# Patient Record
Sex: FEMALE | Race: BLACK OR AFRICAN AMERICAN | NOT HISPANIC OR LATINO | Employment: UNEMPLOYED | URBAN - METROPOLITAN AREA
[De-identification: names, ages, dates, MRNs, and addresses within clinical notes are randomized per-mention and may not be internally consistent; named-entity substitution may affect disease eponyms.]

---

## 2017-02-10 ENCOUNTER — HOSPITAL ENCOUNTER (EMERGENCY)
Facility: HOSPITAL | Age: 47
Discharge: HOME | End: 2017-02-10
Attending: SURGERY
Payer: MEDICAID

## 2017-02-10 VITALS
RESPIRATION RATE: 20 BRPM | SYSTOLIC BLOOD PRESSURE: 134 MMHG | DIASTOLIC BLOOD PRESSURE: 72 MMHG | OXYGEN SATURATION: 99 % | TEMPERATURE: 97 F | HEART RATE: 82 BPM

## 2017-02-10 DIAGNOSIS — S92.902A FOOT FRACTURE, LEFT, CLOSED, INITIAL ENCOUNTER: Primary | ICD-10-CM

## 2017-02-10 DIAGNOSIS — M79.673 FOOT PAIN: ICD-10-CM

## 2017-02-10 PROCEDURE — 28470 CLTX METATARSAL FX WO MNP EA: CPT

## 2017-02-10 PROCEDURE — 63600175 PHARM REV CODE 636 W HCPCS: Performed by: SURGERY

## 2017-02-10 PROCEDURE — 99283 EMERGENCY DEPT VISIT LOW MDM: CPT | Mod: 25

## 2017-02-10 PROCEDURE — 96372 THER/PROPH/DIAG INJ SC/IM: CPT

## 2017-02-10 RX ORDER — CYCLOBENZAPRINE HCL 10 MG
10 TABLET ORAL 3 TIMES DAILY PRN
Qty: 10 TABLET | Refills: 0 | Status: SHIPPED | OUTPATIENT
Start: 2017-02-10 | End: 2017-02-15

## 2017-02-10 RX ORDER — KETOROLAC TROMETHAMINE 10 MG/1
10 TABLET, FILM COATED ORAL EVERY 6 HOURS PRN
Qty: 15 TABLET | Refills: 0 | Status: SHIPPED | OUTPATIENT
Start: 2017-02-10 | End: 2017-07-06 | Stop reason: ALTCHOICE

## 2017-02-10 RX ORDER — KETOROLAC TROMETHAMINE 30 MG/ML
60 INJECTION, SOLUTION INTRAMUSCULAR; INTRAVENOUS
Status: COMPLETED | OUTPATIENT
Start: 2017-02-10 | End: 2017-02-10

## 2017-02-10 RX ADMIN — KETOROLAC TROMETHAMINE 60 MG: 30 INJECTION, SOLUTION INTRAMUSCULAR at 07:02

## 2017-02-10 NOTE — DISCHARGE INSTRUCTIONS
Foot Fracture  You have a broken bone (fracture) in your foot. This will cause pain, swelling, and sometimes bruising. It will take about 4 to 6 weeks to heal. A foot fracture may be treated with a special shoe, splint, cast, or boot.  Home care  Follow these guidelines when caring for yourself at home:  · You may be given a splint, cast, shoe, or boot to keep the injured area from moving. Unless you were told otherwise, use crutches or a walker. Dont put weight on the injured foot until your health care provider says you can do so. (You can rent crutches and a walker at many pharmacies and surgical or orthopedic supply stores.) Dont put weight on a splint, or it will break.  · Keep your leg elevated to reduce pain and swelling. When sleeping, put a pillow under the injured leg. When sitting, support the injured leg so it is level with your waist. This is very important during the first 2 days (48 hours).  · Put an ice pack on the injured area. Do this for 20 minutes every 1 to 2 hours the first day for pain relief. You can make an ice pack by wrapping a plastic bag of ice cubes in a thin towel. As the ice melts, be careful that the splint/cast/boot/shoe doesnt get wet. You can place the ice pack directly over the splint or cast. Unless told otherwise, you can open the boot or shoe to apply the ice pack. Continue using the ice pack 3 to 4 times a day for the next 2 days. Then use the ice pack as needed to ease pain and swelling.  · Keep the splint/cast/boot/shoe dry. When bathing, protect it with a large plastic bag, rubber-banded at the top end. If a fiberglass splint or cast or boot gets wet, you can dry it with a hair dryer. Unless told otherwise, you can take off the boot or shoe to bathe.  · You may use acetaminophen or ibuprofen to control pain, unless another pain medicine was prescribed. If you have chronic liver or kidney disease, talk with your health care provider before using these medicines. Also  talk with your provider if youve had a stomach ulcer or GI bleeding.  · Dont put creams or objects under the cast if you have itching.  Follow-up care  Follow up with your health care provider within 1 week, or as advised. This is to make sure the bone is healing the way it should. If you were given a splint, it may be changed to a cast or boot at your follow-up visit.  If X-rays were taken, a radiologist will look at them. You will be told of any new findings that may affect your care.  When to seek medical advice  Call your health care provider right away if any of these occur:  · The cast cracks  · The plaster cast or splint becomes wet or soft  · The fiberglass cast or splint stays wet for more than 24 hours  · Bad odor from the cast or wound fluid stains the cast  · Tightness or pain under the cast or splint gets worse  · Toes become swollen, cold, blue, numb, or tingly  · You cant move your toes  · Skin around cast becomes red  · Fever of 101ºF (38.3ºC) or higher, or as directed by your health care provider  Date Last Reviewed: 2/15/2015  © 3030-8953 [employer]. [address] [place] [address], [place] [address]. All rights reserved. This information is not intended as a substitute for professional medical care. Always follow your healthcare professional's instructions.

## 2017-02-10 NOTE — ED PROVIDER NOTES
[facility]                                        February 10, 2017                   Chief Complaint  47 y.o. female with Foot Pain (left foot pain)    History of Present Illness  Ami Kamara presents to the emergency room with left foot pain today  Patient awkwardly twisted her left foot yesterday with immediate pain afterwards  Patient on exam has left dorsal foot pain in the area of the fifth metatarsal today  Patient has good distal pulses and capillary refill, is neurovascular intact on exam  X-ray shows a nondisplaced fracture of the fifth metatarsal, cast shoe placed here    The history is provided by the patient  Past medical history: Anxiety  Past surgical history  and tubal ligation  No Known Allergies     Review of Systems and Physical Exam     Review of Systems  -- Constitution - no fever, denies fatigue, no weakness, no chills  -- Eyes - no tearing or redness, no visual disturbance  -- Ear, Nose - no tinnitus or earache, no nasal congestion or discharge  -- Mouth,Throat - no sore throat, no toothache, normal voice, normal swallowing  -- Respiratory - denies cough and congestion, no shortness of breath, no JACOB  -- Cardiovascular - denies chest pain, no palpitations, denies claudication  -- Gastrointestinal - denies abdominal pain, nausea, vomiting, or diarrhea  -- Musculoskeletal - left foot pain  -- Neurological - no headache, denies weakness or seizure; no LOC  -- Skin - denies pallor, rash, or changes in skin. no hives or welts noted    Vital Signs  -- Blood pressure is 147/73 and her pulse is 85. Her respiration is 20.      Physical Exam  -- Nursing note and vitals reviewed  -- Constitutional: Appears well-developed and well-nourished  -- Head: Atraumatic. Normocephalic. No obvious abnormality  -- Eyes: Pupils are equal and reactive to light. Normal conjunctiva and lids  -- Cardiac: Normal rate, regular rhythm and normal heart sounds  -- Pulmonary: Normal  respiratory effort, breath sounds clear to auscultation  -- Abdominal: Soft, no tenderness. Normal bowel sounds. Normal liver edge  -- Musculoskeletal: Left dorsal foot pain in the area of the fifth metatarsal  -- Neurological: No focal deficits. Showed good interaction with staff  -- Vascular: Posterior tibial, dorsalis pedis and radial pulses 2+ bilaterally      Emergency Room Course     Treatment and Evaluation  -- Left foot x-ray shows a fifth metatarsal fracture  -- An ace wrap is placed on the affected foot by the CNA  -- A cast shoe was placed over the ace wrap by the CNA   -- Crutches were also given and taught for ambulation     -- IM Toradol given today in the ER    Diagnosis  -- The primary encounter diagnosis was Foot fracture, left, closed, initial encounter    Disposition and Plan  -- Disposition: home  -- Condition: stable  -- Follow-up: Patient to follow up with Claritza Monreal NP in 1-2 days.  -- I advised the patient that we have found no life threatening condition today  -- At this time, I believe the patient is clinically stable for discharge.   -- The patient acknowledges that close follow up with a MD is required   -- Patient agrees to comply with all instruction and direction given in the ER    This note is dictated on Dragon Natural Speaking word recognition program.  There are word recognition mistakes that are occasionally missed on review.           Florin Choudhary MD  02/10/17 0749

## 2017-02-10 NOTE — ED AVS SNAPSHOT
[facility]  [address] [place] [id number]               Ami Kamara   2/10/2017  7:14 AM   ED    Description:  Female : 1970   Department:  [facility]           Your Care was Coordinated By:     Provider Role From To    Florin Choudhary MD Attending Provider 02/10/17 0716 --      Reason for Visit     Foot Pain           Diagnoses this Visit        Comments    Foot fracture, left, closed, initial encounter    -  Primary     Foot pain           ED Disposition     ED Disposition Condition Comment    Discharge             To Do List           Follow-up Information     Follow up with Claritza Monreal NP. Schedule an appointment as soon as possible for a visit in 2 days.    Specialty:  Family Medicine    Contact information:     [address]  [place] [address]  [phone]         These Medications        Disp Refills Start End    ketorolac (TORADOL) 10 mg tablet 15 tablet 0 2/10/2017     Take 1 tablet (10 mg total) by mouth every 6 (six) hours as needed for Pain. - Oral    Pharmacy: [facility]/pharmacy #[id number] [place], [place] - [address] Ph #: [phone]       cyclobenzaprine (FLEXERIL) 10 MG tablet 10 tablet 0 2/10/2017 2/15/2017    Take 1 tablet (10 mg total) by mouth 3 (three) times daily as needed for Muscle spasms. - Oral    Pharmacy: [facility]/pharmacy #[id number] [place], [place] - [address] Ph #: [phone]         [facility]     [facility] -  Assistance  Registered nurses in the [facility] provide clinical advisement, health education, appointment booking, and other advisory services.  Call for this free service at [phone].             Medications           Message regarding Medications     Verify the changes and/or additions to your medication regime listed below are the same as discussed with your clinician today.  If any of these changes or additions are incorrect, please notify your  healthcare provider.        START taking these NEW medications        Refills    ketorolac (TORADOL) 10 mg tablet 0    Sig: Take 1 tablet (10 mg total) by mouth every 6 (six) hours as needed for Pain.    Class: Normal    Route: Oral    cyclobenzaprine (FLEXERIL) 10 MG tablet 0    Sig: Take 1 tablet (10 mg total) by mouth 3 (three) times daily as needed for Muscle spasms.    Class: Normal    Route: Oral      These medications were administered today        Dose Freq    ketorolac injection 60 mg 60 mg ED 1 Time    Sig: Inject 2 mLs (60 mg total) into the muscle ED 1 Time.    Class: Normal    Route: Intramuscular           Verify that the below list of medications is an accurate representation of the medications you are currently taking.  If none reported, the list may be blank. If incorrect, please contact your healthcare provider. Carry this list with you in case of emergency.           Current Medications     busPIRone (BUSPAR) 5 MG Tab Take 1 tablet (5 mg total) by mouth 3 (three) times daily.    ferrous sulfate 325 mg (65 mg iron) Tab tablet Take 1 tablet (325 mg total) by mouth daily with breakfast.    ranitidine (ZANTAC) 300 MG tablet Take 1 tablet (300 mg total) by mouth every evening.    cyclobenzaprine (FLEXERIL) 10 MG tablet Take 1 tablet (10 mg total) by mouth 3 (three) times daily as needed for Muscle spasms.    hydrOXYzine (ATARAX) 50 MG tablet Take 1 tablet (50 mg total) by mouth 3 (three) times daily as needed for Anxiety.    ibuprofen (ADVIL,MOTRIN) 800 MG tablet Take 1 tablet (800 mg total) by mouth 3 (three) times daily as needed for Pain.    ketorolac (TORADOL) 10 mg tablet Take 1 tablet (10 mg total) by mouth every 6 (six) hours as needed for Pain.    loratadine (CLARITIN) 10 mg tablet Take 1 tablet (10 mg total) by mouth once daily.           Clinical Reference Information           Your Vitals Were     BP Pulse Temp Resp Last Period       147/73 (BP Location: Left arm, Patient Position: Sitting)  85 97.4 °F (36.3 °C) (Oral) 20 01/30/2017       Allergies as of 2/10/2017     No Known Allergies      Immunizations Administered on Date of Encounter - 2/10/2017     None      ED Micro, Lab, POCT     None      ED Imaging Orders     Start Ordered       Status Ordering Provider    02/10/17 0718 02/10/17 0717  X-Ray Foot Complete Left  1 time imaging      Final result         Discharge Instructions         Foot Fracture  You have a broken bone (fracture) in your foot. This will cause pain, swelling, and sometimes bruising. It will take about 4 to 6 weeks to heal. A foot fracture may be treated with a special shoe, splint, cast, or boot.  Home care  Follow these guidelines when caring for yourself at home:  · You may be given a splint, cast, shoe, or boot to keep the injured area from moving. Unless you were told otherwise, use crutches or a walker. Dont put weight on the injured foot until your health care provider says you can do so. (You can rent crutches and a walker at many pharmacies and surgical or orthopedic supply stores.) Dont put weight on a splint, or it will break.  · Keep your leg elevated to reduce pain and swelling. When sleeping, put a pillow under the injured leg. When sitting, support the injured leg so it is level with your waist. This is very important during the first 2 days (48 hours).  · Put an ice pack on the injured area. Do this for 20 minutes every 1 to 2 hours the first day for pain relief. You can make an ice pack by wrapping a plastic bag of ice cubes in a thin towel. As the ice melts, be careful that the splint/cast/boot/shoe doesnt get wet. You can place the ice pack directly over the splint or cast. Unless told otherwise, you can open the boot or shoe to apply the ice pack. Continue using the ice pack 3 to 4 times a day for the next 2 days. Then use the ice pack as needed to ease pain and swelling.  · Keep the splint/cast/boot/shoe dry. When bathing, protect it with a large plastic  bag, rubber-banded at the top end. If a fiberglass splint or cast or boot gets wet, you can dry it with a hair dryer. Unless told otherwise, you can take off the boot or shoe to bathe.  · You may use acetaminophen or ibuprofen to control pain, unless another pain medicine was prescribed. If you have chronic liver or kidney disease, talk with your health care provider before using these medicines. Also talk with your provider if youve had a stomach ulcer or GI bleeding.  · Dont put creams or objects under the cast if you have itching.  Follow-up care  Follow up with your health care provider within 1 week, or as advised. This is to make sure the bone is healing the way it should. If you were given a splint, it may be changed to a cast or boot at your follow-up visit.  If X-rays were taken, a radiologist will look at them. You will be told of any new findings that may affect your care.  When to seek medical advice  Call your health care provider right away if any of these occur:  · The cast cracks  · The plaster cast or splint becomes wet or soft  · The fiberglass cast or splint stays wet for more than 24 hours  · Bad odor from the cast or wound fluid stains the cast  · Tightness or pain under the cast or splint gets worse  · Toes become swollen, cold, blue, numb, or tingly  · You cant move your toes  · Skin around cast becomes red  · Fever of 101ºF (38.3ºC) or higher, or as directed by your health care provider  Date Last Reviewed: 2/15/2015  © 3321-0490 [employer]. [address] [place] [address], [place] [address]. All rights reserved. This information is not intended as a substitute for professional medical care. Always follow your healthcare professional's instructions.          Your Scheduled Appointments     Mar 02, 2017  1:00 PM CST   New Patient with GRAEME Kincaid - Neurology ([facility])    [address] [place], [place] [address] [place] [address]    [phone]            May 02, 2017  9:00 AM CDT   Mammo Diag with [facility] MAMMO1   [facility]    [address] [facility] [address]   [phone]            Aug 09, 2017  7:45 AM CDT   Fasting Lab with [facility]   [facility]    [address] [facility] [address]   [phone]              Smoking Cessation     If you would like to quit smoking:   You may be eligible for free services if you are a [place] resident and started smoking cigarettes before September 1, 1988.  Call the Smoking [employer]essation [employer] ([facility]) toll free at [phone] or [phone].   Call 3-631-QUIT-NOW if you do not meet the above criteria.             [facility] complies with applicable Federal civil rights laws and does not discriminate on the basis of race, color, national origin, age, disability, or sex.        Language Assistance Services     ATTENTION: Language assistance services are available, free of charge. Please call [phone].      ATENCIÓN: Si habla español, tiene a toro disposición servicios gratuitos de asistencia lingüística. Llame al [phone].     CHÚ Ý: N?u b?n nói Ti?ng Vi?t, có các d?ch v? h? tr? ngôn ng? mi?n phí dành cho b?n. G?i s? [phone].

## 2017-03-07 ENCOUNTER — HOSPITAL ENCOUNTER (EMERGENCY)
Facility: HOSPITAL | Age: 47
Discharge: HOME | End: 2017-03-07
Attending: EMERGENCY MEDICINE
Payer: MEDICAID

## 2017-03-07 VITALS
DIASTOLIC BLOOD PRESSURE: 79 MMHG | HEART RATE: 77 BPM | WEIGHT: 179 LBS | RESPIRATION RATE: 18 BRPM | SYSTOLIC BLOOD PRESSURE: 129 MMHG | BODY MASS INDEX: 29.79 KG/M2 | TEMPERATURE: 97 F

## 2017-03-07 DIAGNOSIS — Z00.00 WELL ADULT EXAM: Primary | ICD-10-CM

## 2017-03-07 PROCEDURE — 96372 THER/PROPH/DIAG INJ SC/IM: CPT

## 2017-03-07 PROCEDURE — 63600175 PHARM REV CODE 636 W HCPCS: Performed by: EMERGENCY MEDICINE

## 2017-03-07 PROCEDURE — 99283 EMERGENCY DEPT VISIT LOW MDM: CPT | Mod: 25

## 2017-03-07 RX ORDER — HYDROMORPHONE HYDROCHLORIDE 1 MG/ML
1 INJECTION, SOLUTION INTRAMUSCULAR; INTRAVENOUS; SUBCUTANEOUS
Status: COMPLETED | OUTPATIENT
Start: 2017-03-07 | End: 2017-03-07

## 2017-03-07 RX ORDER — PROMETHAZINE HYDROCHLORIDE 25 MG/ML
25 INJECTION, SOLUTION INTRAMUSCULAR; INTRAVENOUS
Status: COMPLETED | OUTPATIENT
Start: 2017-03-07 | End: 2017-03-07

## 2017-03-07 RX ORDER — HYDROCODONE BITARTRATE AND ACETAMINOPHEN 5; 325 MG/1; MG/1
1 TABLET ORAL EVERY 4 HOURS PRN
Qty: 12 TABLET | Refills: 0 | Status: SHIPPED | OUTPATIENT
Start: 2017-03-07 | End: 2017-06-05

## 2017-03-07 RX ADMIN — HYDROMORPHONE HYDROCHLORIDE 1 MG: 1 INJECTION, SOLUTION INTRAMUSCULAR; INTRAVENOUS; SUBCUTANEOUS at 07:03

## 2017-03-07 RX ADMIN — PROMETHAZINE HYDROCHLORIDE 25 MG: 25 INJECTION INTRAMUSCULAR; INTRAVENOUS at 07:03

## 2017-03-07 NOTE — ED AVS SNAPSHOT
[facility]  [address]  [place] [address]               Ami Kamara   3/7/2017  7:42 PM   ED    Description:  Female : 1970   Department:  [facility]           Your Care was Coordinated By:     Provider Role From To    Luis Alfredo Olmedo MD Attending Provider [phone] --      Reason for Visit     Leg Pain           Diagnoses this Visit        Comments    Well adult exam    -  Primary       ED Disposition     ED Disposition Condition Comment    Discharge             To Do List           Follow-up Information     Schedule an appointment as soon as possible for a visit with Claritza Monreal NP.    Specialty:  Family Medicine    Why:  Follow up with orthopedic as scheduled for continual care    Contact information:     [facility]  [place] [address]  [phone]         These Medications        Disp Refills Start End    hydrocodone-acetaminophen 5-325mg (NORCO) 5-325 mg per tablet 12 tablet 0 3/7/2017 3/17/2017    Take 1 tablet by mouth every 4 (four) hours as needed for Pain. - Oral    Pharmacy: [facility]/pharmacy #[id number] - [place] - [address] Ph #: [phone]         [facility]     [facility] -  Assistance  Registered nurses in the [facility] provide clinical advisement, health education, appointment booking, and other advisory services.  Call for this free service at [phone].             Medications           Message regarding Medications     Verify the changes and/or additions to your medication regime listed below are the same as discussed with your clinician today.  If any of these changes or additions are incorrect, please notify your healthcare provider.        START taking these NEW medications        Refills    hydrocodone-acetaminophen 5-325mg (NORCO) 5-325 mg per tablet 0    Sig: Take 1 tablet by mouth every 4 (four) hours as needed for Pain.    Class: Print    Route:  Oral      These medications were administered today        Dose Freq    hydromorphone (PF) injection 1 mg 1 mg ED 1 Time    Sig: Inject 1 mL (1 mg total) into the muscle ED 1 Time.    Class: Normal    Route: Intramuscular    promethazine injection 25 mg 25 mg ED 1 Time    Sig: Inject 1 mL (25 mg total) into the muscle ED 1 Time.    Class: Normal    Route: Intramuscular           Verify that the below list of medications is an accurate representation of the medications you are currently taking.  If none reported, the list may be blank. If incorrect, please contact your healthcare provider. Carry this list with you in case of emergency.           Current Medications     busPIRone (BUSPAR) 5 MG Tab Take 1 tablet (5 mg total) by mouth 3 (three) times daily.    ferrous sulfate 325 mg (65 mg iron) Tab tablet Take 1 tablet (325 mg total) by mouth daily with breakfast.    hydrocodone-acetaminophen 7.5-325mg (NORCO) 7.5-325 mg per tablet Take 1 tablet by mouth every 8 (eight) hours as needed.    ibuprofen (ADVIL,MOTRIN) 800 MG tablet Take 1 tablet (800 mg total) by mouth 3 (three) times daily as needed for Pain.    ketorolac (TORADOL) 10 mg tablet Take 1 tablet (10 mg total) by mouth every 6 (six) hours as needed for Pain.    levetiracetam (KEPPRA) 500 MG Tab Take 2 tablets (1,000 mg total) by mouth 2 (two) times daily.    loratadine (CLARITIN) 10 mg tablet Take 1 tablet (10 mg total) by mouth once daily.    ranitidine (ZANTAC) 300 MG tablet Take 1 tablet (300 mg total) by mouth every evening.    senna-docusate 8.6-50 mg (SENNA-S) 8.6-50 mg per tablet Take 1 tablet by mouth once daily. Take while on Norco to prevent constipation.    hydrocodone-acetaminophen 5-325mg (NORCO) 5-325 mg per tablet Take 1 tablet by mouth every 4 (four) hours as needed for Pain.           Clinical Reference Information           Your Vitals Were     BP Pulse Temp Resp Weight Last Period    136/85 85 97.7 °F (36.5 °C) (Oral) 20 81.2 kg (179 lb)  02/27/2017 (Approximate)    BMI                29.79 kg/m2          Allergies as of 3/7/2017     No Known Allergies      Immunizations Administered on Date of Encounter - 3/7/2017     None      ED Micro, Lab, POCT     None      ED Imaging Orders     None        Discharge Instructions         Nonurgent Medical Screening Exam  You have had a medical screening exam. The results show that you dont have a condition that needs to be treated in the emergency department.  You can safely wait until you can see your healthcare provider for evaluation or treatment. It is up to you to make an appointment for follow-up care.  Medical emergencies  If you think you have a medical emergency, please come to the emergency department. Thats what we are here for. A medical emergency might be severe pain. It might be a condition that gets worse. Or it might be problems with a pregnancy.  The emergency department is open to all who need treatment. But if you dont think you have a serious or life-threatening problem, try these other choices.  If you have a primary care doctor:  Call your doctor before coming to the emergency department.  After office hours, someone from your doctors office is on-call by phone. The person on-call may be able to give you advice over the phone on how to take care of the problem  You may be able to get an appointment to see your doctor.  If you dont have a primary care doctor:  Call the referral doctor or clinic shown below during office hours. You should be able to make an appointment to be seen.  If you arent sure whether you are having an emergency, you can always return to the emergency department to be looked at.   Phone advice from the emergency department  We are here 24 hours a day to give emergency care. But this hospital does not give phone advice for medical conditions. If you need advice for a condition that cant wait to be seen by your doctor, you will need to come back to this facility in  person.  Date Last Reviewed: 9/1/2016  © 8832-0323 [employer]. [address] [place] [address], [place] [address]. All rights reserved. This information is not intended as a substitute for professional medical care. Always follow your healthcare professional's instructions.          Your Scheduled Appointments     Mar 29, 2017 12:45 PM CDT   Established Patient Visit with GRAEME Castillo - Orthopedics ([facility])    [address] [place] [place] [address]   [phone]            Apr 03, 2017  8:00 AM CDT   Electroencephalogram with EEG, [facility] ([address] Cardiology)    [address] [place] [place] [address]   [phone]            Apr 03, 2017  9:30 AM CDT   Mri Non Contrast with [facility] MRI1   [facility] ([facility])    [address] [place] [place] [address]   [phone]            May 02, 2017  9:00 AM CDT   Mammo Diag with [facility] MAMMO1   [facility] ([facility])    [address] [place] [place] [address]   [phone]            Jun 05, 2017  1:00 PM CDT   Established Patient Visit with GRAEME Kincaid - Neurology ([facility])    [address] [address]   [phone]              Smoking Cessation     If you would like to quit smoking:   You may be eligible for free services if you are a [place] resident and started smoking cigarettes before September 1, 1988.  Call the Smoking Cessation Trust (SCT) toll free at (064) 259-7215 or (219) 505-6446.   Call 7-800-QUIT-NOW if you do not meet the above criteria.             [facility] complies with applicable Federal civil rights laws and does not discriminate on the basis of race, color, national origin, age, disability, or sex.        Language Assistance Services     ATTENTION: Language assistance services are available, free of  charge. Please call [phone].      ATENCIÓN: Si habla español, tiene a toro disposición servicios gratuitos de asistencia lingüística. Llame al [phone].     CHÚ Ý: N?u b?n nói Ti?ng Vi?t, có các d?ch v? h? tr? ngôn ng? mi?n phí dành cho b?n. G?i s? [phone].

## 2017-03-08 NOTE — ED PROVIDER NOTES
"Encounter Date: 3/7/2017       History     Chief Complaint   Patient presents with    Leg Pain     "I want this cast off."     Review of patient's allergies indicates:  No Known Allergies  HPI Comments: PAtient states fracture to foot.  States being followed by orthopedic.  States ''annoyed'' with cast.    Patient is a 47 y.o. female presenting with the following complaint: injury. The history is provided by the patient.   General Injury    The incident occurred today. The injury mechanism is unknown. She came to the ER via by private vehicle. There is an injury to the left lower leg. The pain is at a severity of 3/10. Pertinent negatives include no chest pain, no altered mental status, no numbness, no visual disturbance, no abdominal pain, no nausea, no vomiting, no bladder incontinence, no headaches, no hearing loss, no neck pain, no focal weakness, no decreased responsiveness, no light-headedness, no loss of consciousness, no seizures, no tingling, no weakness, no cough, no difficulty breathing and no memory loss. Recently, medical care has been given by a specialist.     Past Medical History:   Diagnosis Date    Anxiety     GERD (gastroesophageal reflux disease)      Past Surgical History:   Procedure Laterality Date     SECTION      x2    TUBAL LIGATION       Family History   Problem Relation Age of Onset    Hypertension Mother     Seizures Mother     Hypertension Father      Social History   Substance Use Topics    Smoking status: Current Every Day Smoker     Packs/day: 2.00     Years: 26.00    Smokeless tobacco: Never Used    Alcohol use Yes     Review of Systems   Constitutional: Negative for decreased responsiveness, fatigue and fever.   HENT: Negative for hearing loss.    Eyes: Negative for pain, discharge, redness, itching and visual disturbance.   Respiratory: Negative for cough, shortness of breath, wheezing and stridor.    Cardiovascular: Negative for chest pain and palpitations. "   Gastrointestinal: Negative for abdominal pain, nausea and vomiting.   Genitourinary: Negative for bladder incontinence, enuresis and flank pain.   Musculoskeletal: Negative for arthralgias, back pain, neck pain and neck stiffness.   Skin: Negative for color change, pallor, rash and wound.   Neurological: Negative for tingling, tremors, focal weakness, seizures, loss of consciousness, syncope, weakness, light-headedness, numbness and headaches.   Psychiatric/Behavioral: Negative for memory loss.       Physical Exam   Initial Vitals   BP Pulse Resp Temp SpO2   03/07/17 1939 03/07/17 1939 03/07/17 1939 03/07/17 1939 --   136/85 85 20 97.7 °F (36.5 °C)      Physical Exam    Nursing note and vitals reviewed.  Constitutional: She appears well-developed and well-nourished. She is not diaphoretic. No distress.   HENT:   Head: Normocephalic and atraumatic.   Mouth/Throat: No oropharyngeal exudate.   Eyes: Conjunctivae and EOM are normal. Pupils are equal, round, and reactive to light. Right eye exhibits no discharge. Left eye exhibits no discharge. No scleral icterus.   Neck: Normal range of motion. No JVD present.   Cardiovascular: Normal rate, regular rhythm and normal heart sounds. Exam reveals no friction rub.    Pulmonary/Chest: Breath sounds normal. No stridor. No respiratory distress. She has no wheezes. She has no rhonchi. She has no rales.   Abdominal: Soft. Bowel sounds are normal. She exhibits no distension. There is no tenderness. There is no rebound and no guarding.   Musculoskeletal: She exhibits no edema or tenderness.        Feet:    Neurological: She is alert and oriented to person, place, and time. She has normal strength and normal reflexes. No sensory deficit.   Skin: No rash noted.         ED Course   Procedures  Labs Reviewed - No data to display                            ED Course     Clinical Impression:   The encounter diagnosis was Well adult exam.    Disposition:   Disposition:  Discharged  Condition: Stable       Luis Alfredo Olmedo MD  03/07/17 1952

## 2017-03-08 NOTE — DISCHARGE INSTRUCTIONS
Nonurgent Medical Screening Exam  You have had a medical screening exam. The results show that you dont have a condition that needs to be treated in the emergency department.  You can safely wait until you can see your healthcare provider for evaluation or treatment. It is up to you to make an appointment for follow-up care.  Medical emergencies  If you think you have a medical emergency, please come to the emergency department. Thats what we are here for. A medical emergency might be severe pain. It might be a condition that gets worse. Or it might be problems with a pregnancy.  The emergency department is open to all who need treatment. But if you dont think you have a serious or life-threatening problem, try these other choices.  If you have a primary care doctor:  Call your doctor before coming to the emergency department.  After office hours, someone from your doctors office is on-call by phone. The person on-call may be able to give you advice over the phone on how to take care of the problem  You may be able to get an appointment to see your doctor.  If you dont have a primary care doctor:  Call the referral doctor or clinic shown below during office hours. You should be able to make an appointment to be seen.  If you arent sure whether you are having an emergency, you can always return to the emergency department to be looked at.   Phone advice from the emergency department  We are here 24 hours a day to give emergency care. But this hospital does not give phone advice for medical conditions. If you need advice for a condition that cant wait to be seen by your doctor, you will need to come back to this facility in person.  Date Last Reviewed: 9/1/2016 © 2000-2016 [employer]. [address] [place] [address], [place] [address]. All rights reserved. This information is not intended as a substitute for professional medical care. Always follow your healthcare professional's instructions.

## 2017-07-06 ENCOUNTER — HOSPITAL ENCOUNTER (EMERGENCY)
Facility: HOSPITAL | Age: 47
Discharge: HOME | End: 2017-07-06
Attending: SURGERY
Payer: MEDICAID

## 2017-07-06 VITALS
RESPIRATION RATE: 18 BRPM | OXYGEN SATURATION: 99 % | TEMPERATURE: 99 F | BODY MASS INDEX: 24.96 KG/M2 | DIASTOLIC BLOOD PRESSURE: 74 MMHG | WEIGHT: 150 LBS | HEART RATE: 81 BPM | SYSTOLIC BLOOD PRESSURE: 129 MMHG

## 2017-07-06 DIAGNOSIS — M79.646 FINGER PAIN: ICD-10-CM

## 2017-07-06 PROCEDURE — 25000003 PHARM REV CODE 250: Performed by: SURGERY

## 2017-07-06 PROCEDURE — 99283 EMERGENCY DEPT VISIT LOW MDM: CPT

## 2017-07-06 RX ORDER — KETOROLAC TROMETHAMINE 10 MG/1
10 TABLET, FILM COATED ORAL EVERY 6 HOURS PRN
Qty: 15 TABLET | Refills: 0 | Status: SHIPPED | OUTPATIENT
Start: 2017-07-06 | End: 2018-07-19

## 2017-07-06 RX ORDER — KETOROLAC TROMETHAMINE 10 MG/1
10 TABLET, FILM COATED ORAL
Status: COMPLETED | OUTPATIENT
Start: 2017-07-06 | End: 2017-07-06

## 2017-07-06 RX ADMIN — KETOROLAC TROMETHAMINE 10 MG: 10 TABLET, FILM COATED ORAL at 03:07

## 2017-07-06 NOTE — ED PROVIDER NOTES
[facility]                                        2017                   Chief Complaint  47 y.o. female with Finger Injury    History of Present Illness  Ami Kamara presents to the emergency room with right fifth finger pain  Patient on exam has no obvious trauma or bruising, no swelling to the right fifth finger  Patient states that her right fifth finger at the tip and middle phalanx has been hurting  Patient denies any trauma or fall, has no obvious acute pathology on examination  Patient has good distal pulses and capillary refill, is neurovascular intact on exam  Patient states her right fifth finger hurts, good  and normal tone appreciated     The history is provided by the patient  Past medical history: Anxiety  Past surgical history  and tubal ligation  No Known Allergies   Family history: Hypertension & seizures    Review of Systems and Physical Exam     Review of Systems  -- Constitution - no fever, denies fatigue, no weakness, no chills  -- Eyes - no tearing or redness, no visual disturbance  -- Ear, Nose - no tinnitus or earache, no nasal congestion or discharge  -- Mouth,Throat - no sore throat, no toothache, normal voice, normal swallowing  -- Respiratory - denies cough and congestion, no shortness of breath, no JACOB  -- Cardiovascular - denies chest pain, no palpitations, denies claudication  -- Gastrointestinal - denies abdominal pain, nausea, vomiting, or diarrhea  -- Musculoskeletal - right fifth finger pain this morning  -- Neurological - no headache, denies weakness or seizure; no LOC  -- Skin - denies pallor, rash, or changes in skin. no hives or welts noted    Vital Signs  -- Her blood pressure is 129/74 and her pulse is 81.   -- Her respiration is 18 and oxygen saturation is 99%.      Physical Exam  -- Nursing note and vitals reviewed  -- Head: Atraumatic. Normocephalic. No obvious abnormality  -- Eyes: Pupils are equal and reactive to  light. Normal conjunctiva and lids  -- Cardiac: Normal rate, regular rhythm and normal heart sounds  -- Pulmonary: Normal respiratory effort, breath sounds clear to auscultation  -- Abdominal: Soft, no tenderness. Normal bowel sounds. Normal liver edge  -- Musculoskeletal: Normal range of motion, no effusions. Joints stable   -- Neurological: No focal deficits. Showed good interaction with staff  -- Vascular: Posterior tibial, dorsalis pedis and radial pulses 2+ bilaterally    -- Lymphatics: No cervical or peripheral lymphadenopathy. No edema noted    Emergency Room Course     Treatment and Evaluation  -- Preliminary ER x-ray readings showed no evidence of fracture or dislocation  -- All x-rays are reviewed with a final disposition given by the radiologist   -- 10 mg Toradol by mouth given in the ER    Diagnosis  -- The encounter diagnosis was Finger pain.    Disposition and Plan  -- Disposition: home  -- Condition: stable  -- Follow-up: Patient to follow up with Claritza Monreal NP in 1-2 days.  -- I advised the patient that we have found no life threatening condition today  -- At this time, I believe the patient is clinically stable for discharge.   -- The patient acknowledges that close follow up with a MD is required   -- Patient agrees to comply with all instruction and direction given in the ER    This note is dictated on Dragon Natural Speaking word recognition program.  There are word recognition mistakes that are occasionally missed on review.           Florin Choudhary MD  07/06/17 4945

## 2017-11-16 ENCOUNTER — TELEPHONE (OUTPATIENT)
Dept: ADMINISTRATIVE | Facility: HOSPITAL | Age: 47
End: 2017-11-16

## 2018-01-16 ENCOUNTER — TELEPHONE (OUTPATIENT)
Dept: ADMINISTRATIVE | Facility: HOSPITAL | Age: 48
End: 2018-01-16

## 2018-01-19 ENCOUNTER — HOSPITAL ENCOUNTER (EMERGENCY)
Facility: HOSPITAL | Age: 48
Discharge: HOME | End: 2018-01-19
Attending: SURGERY
Payer: MEDICAID

## 2018-01-19 VITALS
WEIGHT: 155 LBS | HEART RATE: 89 BPM | BODY MASS INDEX: 25.79 KG/M2 | RESPIRATION RATE: 20 BRPM | TEMPERATURE: 99 F | SYSTOLIC BLOOD PRESSURE: 128 MMHG | DIASTOLIC BLOOD PRESSURE: 76 MMHG

## 2018-01-19 DIAGNOSIS — N30.01 ACUTE CYSTITIS WITH HEMATURIA: Primary | ICD-10-CM

## 2018-01-19 DIAGNOSIS — A59.9 TRICHOMONAS INFECTION: ICD-10-CM

## 2018-01-19 DIAGNOSIS — N92.1 MENORRHAGIA WITH IRREGULAR CYCLE: ICD-10-CM

## 2018-01-19 LAB
ALBUMIN SERPL BCP-MCNC: 4.1 G/DL
ALP SERPL-CCNC: 78 U/L
ALT SERPL W/O P-5'-P-CCNC: 7 U/L
AMPHET+METHAMPHET UR QL: NORMAL
ANION GAP SERPL CALC-SCNC: 11 MMOL/L
AST SERPL-CCNC: 12 U/L
B-HCG UR QL: NEGATIVE
BACTERIA #/AREA URNS HPF: ABNORMAL /HPF
BARBITURATES UR QL SCN>200 NG/ML: NEGATIVE
BASOPHILS # BLD AUTO: 0.02 K/UL
BASOPHILS NFR BLD: 0.5 %
BENZODIAZ UR QL SCN>200 NG/ML: NEGATIVE
BILIRUB SERPL-MCNC: 0.7 MG/DL
BILIRUB UR QL STRIP: NEGATIVE
BUN SERPL-MCNC: 5 MG/DL
BZE UR QL SCN: NEGATIVE
CALCIUM SERPL-MCNC: 9.2 MG/DL
CANNABINOIDS UR QL SCN: NORMAL
CHLORIDE SERPL-SCNC: 104 MMOL/L
CLARITY UR: CLEAR
CO2 SERPL-SCNC: 23 MMOL/L
COLOR UR: YELLOW
CREAT SERPL-MCNC: 0.9 MG/DL
CREAT UR-MCNC: 57.4 MG/DL
DIFFERENTIAL METHOD: ABNORMAL
EOSINOPHIL # BLD AUTO: 0.1 K/UL
EOSINOPHIL NFR BLD: 3.2 %
ERYTHROCYTE [DISTWIDTH] IN BLOOD BY AUTOMATED COUNT: 14.4 %
EST. GFR  (AFRICAN AMERICAN): >60 ML/MIN/1.73 M^2
EST. GFR  (NON AFRICAN AMERICAN): >60 ML/MIN/1.73 M^2
GLUCOSE SERPL-MCNC: 100 MG/DL
GLUCOSE UR QL STRIP: NEGATIVE
HCT VFR BLD AUTO: 31 %
HGB BLD-MCNC: 10.5 G/DL
HGB UR QL STRIP: ABNORMAL
KETONES UR QL STRIP: NEGATIVE
LEUKOCYTE ESTERASE UR QL STRIP: ABNORMAL
LYMPHOCYTES # BLD AUTO: 2 K/UL
LYMPHOCYTES NFR BLD: 45.4 %
MCH RBC QN AUTO: 25.5 PG
MCHC RBC AUTO-ENTMCNC: 33.9 G/DL
MCV RBC AUTO: 75 FL
METHADONE UR QL SCN>300 NG/ML: NEGATIVE
MICROSCOPIC COMMENT: ABNORMAL
MONOCYTES # BLD AUTO: 0.4 K/UL
MONOCYTES NFR BLD: 9 %
NEUTROPHILS # BLD AUTO: 1.8 K/UL
NEUTROPHILS NFR BLD: 41.9 %
NITRITE UR QL STRIP: NEGATIVE
OPIATES UR QL SCN: NEGATIVE
PCP UR QL SCN>25 NG/ML: NEGATIVE
PH UR STRIP: 6 [PH] (ref 5–8)
PLATELET # BLD AUTO: 262 K/UL
PMV BLD AUTO: 10.7 FL
POTASSIUM SERPL-SCNC: 3.5 MMOL/L
PROT SERPL-MCNC: 7.3 G/DL
PROT UR QL STRIP: NEGATIVE
RBC # BLD AUTO: 4.11 M/UL
RBC #/AREA URNS HPF: 25 /HPF (ref 0–4)
SODIUM SERPL-SCNC: 138 MMOL/L
SP GR UR STRIP: <=1.005 (ref 1–1.03)
TOXICOLOGY INFORMATION: NORMAL
TRICHOMONAS UR QL MICRO: ABNORMAL
URN SPEC COLLECT METH UR: ABNORMAL
UROBILINOGEN UR STRIP-ACNC: NEGATIVE EU/DL
WBC # BLD AUTO: 4.34 K/UL
WBC #/AREA URNS HPF: 10 /HPF (ref 0–5)

## 2018-01-19 PROCEDURE — 81000 URINALYSIS NONAUTO W/SCOPE: CPT | Mod: 59

## 2018-01-19 PROCEDURE — 36415 COLL VENOUS BLD VENIPUNCTURE: CPT

## 2018-01-19 PROCEDURE — 63600175 PHARM REV CODE 636 W HCPCS: Performed by: SURGERY

## 2018-01-19 PROCEDURE — 25000003 PHARM REV CODE 250: Performed by: SURGERY

## 2018-01-19 PROCEDURE — 80307 DRUG TEST PRSMV CHEM ANLYZR: CPT

## 2018-01-19 PROCEDURE — 99284 EMERGENCY DEPT VISIT MOD MDM: CPT | Mod: 25

## 2018-01-19 PROCEDURE — 85025 COMPLETE CBC W/AUTO DIFF WBC: CPT

## 2018-01-19 PROCEDURE — 81025 URINE PREGNANCY TEST: CPT

## 2018-01-19 PROCEDURE — 80053 COMPREHEN METABOLIC PANEL: CPT

## 2018-01-19 PROCEDURE — 96372 THER/PROPH/DIAG INJ SC/IM: CPT

## 2018-01-19 PROCEDURE — 87086 URINE CULTURE/COLONY COUNT: CPT

## 2018-01-19 RX ORDER — NITROFURANTOIN 25; 75 MG/1; MG/1
100 CAPSULE ORAL 2 TIMES DAILY
Qty: 14 CAPSULE | Refills: 0 | Status: SHIPPED | OUTPATIENT
Start: 2018-01-19 | End: 2018-01-26

## 2018-01-19 RX ORDER — NORGESTIMATE AND ETHINYL ESTRADIOL 7DAYSX3 LO
1 KIT ORAL DAILY
Qty: 30 TABLET | Refills: 11 | Status: SHIPPED | OUTPATIENT
Start: 2018-01-19 | End: 2018-10-19 | Stop reason: ALTCHOICE

## 2018-01-19 RX ORDER — CEFTRIAXONE 1 G/1
1 INJECTION, POWDER, FOR SOLUTION INTRAMUSCULAR; INTRAVENOUS ONCE
Status: COMPLETED | OUTPATIENT
Start: 2018-01-19 | End: 2018-01-19

## 2018-01-19 RX ORDER — METRONIDAZOLE 500 MG/1
1000 TABLET ORAL
Status: COMPLETED | OUTPATIENT
Start: 2018-01-19 | End: 2018-01-19

## 2018-01-19 RX ORDER — LIDOCAINE HYDROCHLORIDE 10 MG/ML
1 INJECTION INFILTRATION; PERINEURAL ONCE
Status: COMPLETED | OUTPATIENT
Start: 2018-01-19 | End: 2018-01-19

## 2018-01-19 RX ADMIN — LIDOCAINE HYDROCHLORIDE 1 ML: 10 INJECTION, SOLUTION INFILTRATION; PERINEURAL at 06:01

## 2018-01-19 RX ADMIN — CEFTRIAXONE SODIUM 1 G: 1 INJECTION, POWDER, FOR SOLUTION INTRAMUSCULAR; INTRAVENOUS at 06:01

## 2018-01-19 RX ADMIN — METRONIDAZOLE 1000 MG: 500 TABLET ORAL at 06:01

## 2018-01-19 NOTE — ED TRIAGE NOTES
48 y.o. female presents to ER ED 01/ED 01A   Chief Complaint   Patient presents with    Dysmenorrhea   Pt reports that she has been on her menstrual cycle for 11 days, and wants it to stop. No acute distress noted.

## 2018-01-20 NOTE — ED PROVIDER NOTES
[facility]                                         2018                   Chief Complaint  48 y.o. female with Dysmenorrhea    History of Present Illness  Ami Kamara presents to the emergency room with irregular menstrual bleeding   Pt has had heavy cycles for last couple months, on again off again bleeding for last 10 days   Patient on exam has a soft abdomen no flank pain, no suprapubic pain on ER evaluation today   Patient states that she has had irregular menstrual cycles for some time, mild dysuria today  Patient denies any vaginal discharge, denies any hematuria , denies any risk for STD or PID    The history is provided by the patient  Past medical history: Anxiety  Past surgical history  and tubal ligation  No Known Allergies   Family history: Hypertension & seizures    Review of Systems and Physical Exam     Review of Systems  -- Constitution - no fever, denies fatigue, no weakness, no chills  -- Eyes - no tearing or redness, no visual disturbance  -- Ear, Nose - no tinnitus or earache, no nasal congestion or discharge  -- Mouth,Throat - no sore throat, no toothache, normal voice, normal swallowing  -- Respiratory - denies cough and congestion, no shortness of breath, no JACOB  -- Cardiovascular - denies chest pain, no palpitations, denies claudication  -- Gastrointestinal - denies abdominal pain, nausea, vomiting, or diarrhea  -- Genitourinary - heavy menustration, dysuria, no denies flank pain, no hematuria   -- Musculoskeletal - denies back pain, negative for myalgias and arthralgias   -- Neurological - no headache, denies weakness or seizure; no LOC  -- Skin - denies pallor, rash, or changes in skin. no hives or welts noted    Vital Signs  -- Her oral temperature is 98.5 °F (36.9 °C).   -- Her blood pressure is 128/76 and her pulse is 89.   -- Her respiration is 20.      Physical Exam  -- Nursing note and vitals reviewed  -- Head: Atraumatic.  Normocephalic. No obvious abnormality  -- Eyes: Pupils are equal and reactive to light. Normal conjunctiva and lids  -- Nose: Nose normal in appearance, nares grossly normal. No discharge  -- Throat: Mucous membranes moist, pharynx normal, normal tonsils. No lesions   -- Ears: External ears and TM normal bilaterally. Normal hearing and no drainage  -- Neck: Normal range of motion. Neck supple. No masses, trachea midline  -- Cardiac: Normal rate, regular rhythm and normal heart sounds  -- Pulmonary: Normal respiratory effort, breath sounds clear to auscultation  -- Abdominal: Soft, no tenderness. Normal bowel sounds. Normal liver edge  -- Musculoskeletal: Normal range of motion, no effusions. Joints stable   -- Neurological: No focal deficits. Showed good interaction with staff  -- Vascular: Posterior tibial, dorsalis pedis and radial pulses 2+ bilaterally      Emergency Room Course     Labs     K 3.5      CO2 23   BUN 5 (L)   CREATININE 0.9      ALKPHOS 78   AST 12   ALT 7 (L)   BILITOT 0.7   ALBUMIN 4.1   PROT 7.3   WBC 4.34   HGB 10.5 (L)   HCT 31.0 (L)        Urinalysis  -- Urinalysis performed during this ER visit showed signs of infection and Trichomonas  -- The urine today has been sent for lab culture, results pending   -- The urine pregnancy test today was negative; patient informed of the results     Medications Given  -- cefTRIAXone injection 1 g (1 g Intramuscular Given 1/19/18 1835)   -- lidocaine HCL 10 mg/ml (1%) injection 1 mL (1 mL Intramuscular Given 1/19/18 1835)   -- metroNIDAZOLE tablet 1,000 mg (1,000 mg Oral Given 1/19/18 1835)     Medical Decision Making  -- Diagnosis management comments: 48 y.o. female with irregular menstrual cycles  --  Patient counseled to follow up with OB/GYN on Monday for a formal pap smear    Diagnosis  -- Acute cystitis with hematuria  -- Trichomonas infection   -- Menorrhagia with irregular cycle     Disposition and Plan  -- Disposition:  home  -- Condition: stable  -- Follow-up: Patient to follow up with OBGYN in 1-2 days.  -- I advised the patient that we have found no life threatening condition today  -- At this time, I believe the patient is clinically stable for discharge.   -- The patient acknowledges that close follow up with a MD is required   -- Patient agrees to comply with all instruction and direction given in the ER    This note is dictated on Dragon [employer] word recognition program.  There are word recognition mistakes that are occasionally missed on review.           Florin Choudhary MD  01/19/18 2040

## 2018-01-21 LAB
BACTERIA UR CULT: NORMAL
BACTERIA UR CULT: NORMAL

## 2018-07-17 ENCOUNTER — HOSPITAL ENCOUNTER (EMERGENCY)
Facility: HOSPITAL | Age: 48
Discharge: HOME | End: 2018-07-17
Attending: SURGERY
Payer: MEDICAID

## 2018-07-17 VITALS
DIASTOLIC BLOOD PRESSURE: 78 MMHG | HEART RATE: 74 BPM | RESPIRATION RATE: 14 BRPM | SYSTOLIC BLOOD PRESSURE: 127 MMHG | TEMPERATURE: 99 F

## 2018-07-17 DIAGNOSIS — N92.4 EXCESSIVE BLEEDING IN PREMENOPAUSAL PERIOD: Primary | ICD-10-CM

## 2018-07-17 LAB
ALBUMIN SERPL BCP-MCNC: 3.8 G/DL
ALP SERPL-CCNC: 65 U/L
ALT SERPL W/O P-5'-P-CCNC: 11 U/L
ANION GAP SERPL CALC-SCNC: 6 MMOL/L
AST SERPL-CCNC: 14 U/L
BASOPHILS # BLD AUTO: 0.01 K/UL
BASOPHILS NFR BLD: 0.2 %
BILIRUB SERPL-MCNC: 0.8 MG/DL
BUN SERPL-MCNC: 8 MG/DL
CALCIUM SERPL-MCNC: 9 MG/DL
CHLORIDE SERPL-SCNC: 108 MMOL/L
CO2 SERPL-SCNC: 25 MMOL/L
CREAT SERPL-MCNC: 0.9 MG/DL
DIFFERENTIAL METHOD: ABNORMAL
EOSINOPHIL # BLD AUTO: 0.1 K/UL
EOSINOPHIL NFR BLD: 2.9 %
ERYTHROCYTE [DISTWIDTH] IN BLOOD BY AUTOMATED COUNT: 15.7 %
EST. GFR  (AFRICAN AMERICAN): >60 ML/MIN/1.73 M^2
EST. GFR  (NON AFRICAN AMERICAN): >60 ML/MIN/1.73 M^2
GLUCOSE SERPL-MCNC: 97 MG/DL
HCT VFR BLD AUTO: 31.8 %
HGB BLD-MCNC: 10.5 G/DL
INR PPP: 1.1
LYMPHOCYTES # BLD AUTO: 1.8 K/UL
LYMPHOCYTES NFR BLD: 37 %
MCH RBC QN AUTO: 25.4 PG
MCHC RBC AUTO-ENTMCNC: 33 G/DL
MCV RBC AUTO: 77 FL
MONOCYTES # BLD AUTO: 0.4 K/UL
MONOCYTES NFR BLD: 7.4 %
NEUTROPHILS # BLD AUTO: 2.6 K/UL
NEUTROPHILS NFR BLD: 52.5 %
PLATELET # BLD AUTO: 306 K/UL
PMV BLD AUTO: 10 FL
POTASSIUM SERPL-SCNC: 4.5 MMOL/L
PROT SERPL-MCNC: 7.1 G/DL
PROTHROMBIN TIME: 11.4 SEC
RBC # BLD AUTO: 4.13 M/UL
SODIUM SERPL-SCNC: 139 MMOL/L
WBC # BLD AUTO: 4.86 K/UL

## 2018-07-17 PROCEDURE — 80053 COMPREHEN METABOLIC PANEL: CPT

## 2018-07-17 PROCEDURE — 85025 COMPLETE CBC W/AUTO DIFF WBC: CPT

## 2018-07-17 PROCEDURE — 85610 PROTHROMBIN TIME: CPT

## 2018-07-17 PROCEDURE — 99283 EMERGENCY DEPT VISIT LOW MDM: CPT

## 2018-07-17 PROCEDURE — 36415 COLL VENOUS BLD VENIPUNCTURE: CPT

## 2018-07-17 NOTE — ED PROVIDER NOTES
Encounter Date: 2018       History     Chief Complaint   Patient presents with    Vaginal Bleeding     Patient is a 47yo B female with ongoing intermittent menorrhagia for several months.  Was seen here for this in 2018, arranged f/u with Dr. Mc of [facility], but patient failed to follow-up with Dr. Mc.  She states that she has been bleeding for several days.          Review of patient's allergies indicates:  No Known Allergies  Past Medical History:   Diagnosis Date    Anxiety     GERD (gastroesophageal reflux disease)      Past Surgical History:   Procedure Laterality Date     SECTION      x2    TUBAL LIGATION       Family History   Problem Relation Age of Onset    Hypertension Mother     Seizures Mother     Hypertension Father      Social History   Substance Use Topics    Smoking status: Current Every Day Smoker     Packs/day: 2.00     Years: 26.00    Smokeless tobacco: Never Used    Alcohol use Yes     Review of Systems   Constitutional: Positive for fatigue.   Respiratory: Negative.    Gastrointestinal: Negative.    Genitourinary: Positive for menstrual problem, pelvic pain and vaginal bleeding.   Musculoskeletal: Negative.    Neurological: Negative.    Hematological: Negative.    Psychiatric/Behavioral: Negative.        Physical Exam     Initial Vitals [18 0959]   BP Pulse Resp Temp SpO2   (!) 133/93 74 18 97.4 °F (36.3 °C) --      MAP       --         Physical Exam    Nursing note and vitals reviewed.  Constitutional: She appears well-developed and well-nourished.   HENT:   Head: Normocephalic and atraumatic.   Eyes: EOM are normal. Pupils are equal, round, and reactive to light.   Neck: Normal range of motion. Neck supple.   Cardiovascular: Normal rate and regular rhythm.   Pulmonary/Chest: Breath sounds normal. No respiratory distress. She has no wheezes. She has no rales.   Abdominal: Soft. Bowel sounds are normal.   Neurological: She is alert and oriented to  person, place, and time.   Skin: Skin is warm and dry.   Psychiatric: She has a normal mood and affect. Thought content normal.         ED Course   Procedures  Labs Reviewed - No data to display       Imaging Results    None        Hct stable at 31.  Will refer to OB/GYN.                       Clinical Impression:   The encounter diagnosis was Excessive bleeding in premenopausal period.      Disposition:   Disposition: Discharged  Condition: Stable                        Bear Julio Jr., MD  07/17/18 1414

## 2018-07-17 NOTE — ED NOTES
Discharged to home/self care.    - Condition at discharge: Good  - Mode of Discharge: Ambulatory  - The patient left the ED by herself.  - The discharge instructions were discussed with the patient.  - She stated an understanding of the discharge instructions.  - Walked pt to the discharge station.

## 2019-01-01 ENCOUNTER — HOSPITAL ENCOUNTER (EMERGENCY)
Facility: HOSPITAL | Age: 49
Discharge: HOME | End: 2019-01-01
Attending: SURGERY
Payer: MEDICAID

## 2019-01-01 VITALS
HEART RATE: 88 BPM | DIASTOLIC BLOOD PRESSURE: 76 MMHG | RESPIRATION RATE: 18 BRPM | WEIGHT: 168.63 LBS | OXYGEN SATURATION: 99 % | SYSTOLIC BLOOD PRESSURE: 133 MMHG | BODY MASS INDEX: 28.07 KG/M2 | TEMPERATURE: 99 F

## 2019-01-01 DIAGNOSIS — J00 ACUTE NASOPHARYNGITIS: Primary | ICD-10-CM

## 2019-01-01 PROCEDURE — 99284 EMERGENCY DEPT VISIT MOD MDM: CPT | Mod: 25

## 2019-01-01 PROCEDURE — 63600175 PHARM REV CODE 636 W HCPCS: Performed by: SURGERY

## 2019-01-01 PROCEDURE — 96372 THER/PROPH/DIAG INJ SC/IM: CPT

## 2019-01-01 RX ORDER — PROMETHAZINE HYDROCHLORIDE AND DEXTROMETHORPHAN HYDROBROMIDE 6.25; 15 MG/5ML; MG/5ML
5 SYRUP ORAL EVERY 6 HOURS PRN
Qty: 118 ML | Refills: 0 | Status: SHIPPED | OUTPATIENT
Start: 2019-01-01 | End: 2019-01-11

## 2019-01-01 RX ORDER — LEVOFLOXACIN 500 MG/1
500 TABLET, FILM COATED ORAL DAILY
Qty: 7 TABLET | Refills: 0 | Status: SHIPPED | OUTPATIENT
Start: 2019-01-01 | End: 2019-01-08

## 2019-01-01 RX ORDER — METHYLPREDNISOLONE SOD SUCC 125 MG
125 VIAL (EA) INJECTION
Status: COMPLETED | OUTPATIENT
Start: 2019-01-01 | End: 2019-01-01

## 2019-01-01 RX ORDER — METHYLPREDNISOLONE 4 MG/1
TABLET ORAL
Qty: 1 PACKAGE | Refills: 0 | Status: SHIPPED | OUTPATIENT
Start: 2019-01-01 | End: 2019-04-01

## 2019-01-01 RX ADMIN — METHYLPREDNISOLONE SODIUM SUCCINATE 125 MG: 125 INJECTION, POWDER, FOR SOLUTION INTRAMUSCULAR; INTRAVENOUS at 04:01

## 2019-01-01 NOTE — ED PROVIDER NOTES
[facility]                                                 Chief Complaint  48 y.o. female with Sinusitis    History of Present Illness  Ami Kamara presents to the emergency room with nasal congestion  Patient has nasal congestion and sinus pressure, clear nasal drainage in the ER  Patient has clear lung sounds bilaterally, cough and cold symptoms this weekend  The patient has no nausea vomiting or diarrhea, no flu-like symptoms on ER ROS    The history is provided by the patient   device was not used during this ER visit  Past medical history: Anxiety  Past surgical history  and tubal ligation  No Known Allergies   Family history: Hypertension & seizures    Review of Systems and Physical Exam      Review of Systems  -- Constitution - no fever, denies fatigue, no weakness, no chills  -- Eyes - no tearing or redness, no visual disturbance  -- Ear, Nose - sneezing, nasal congestion and clear discharge   -- Mouth,Throat - no sore throat, no toothache, normal voice, normal swallowing  -- Respiratory - cough and congestion, no shortness of breath, no JACOB  -- Cardiovascular - denies chest pain, no palpitations, denies claudication  -- Gastrointestinal - denies abdominal pain, nausea, vomiting, or diarrhea  -- Genitourinary - no dysuria, denies flank pain, no hematuria, no STD risk  -- Musculoskeletal - denies back pain, negative for trauma or injury  -- Neurological - no headache, denies weakness or seizure; no LOC  -- Skin - denies pallor, rash, or changes in skin. no hives or welts noted  -- Psychiatric - Denies SI or HI, no psychosis or fractured thought noted     Vital Signs  Her oral temperature is 98.7 °F (37.1 °C).   Her blood pressure is 133/76 and her pulse is 88.   Her respiration is 18 and oxygen saturation is 99%.     Physical Exam  -- Nursing note and vitals reviewed  -- Constitutional: Appears well-developed and well-nourished  -- Head: Atraumatic.  Normocephalic. No obvious abnormality  -- Eyes: Pupils are equal and reactive to light. Normal conjunctiva and lids  -- Nose: nasal mucosa erythema and edema; clear nasal discharge noted   -- Throat: Mucous membranes moist, pharynx normal, normal tonsils. No lesions   -- Ears: External ears and TM normal bilaterally. Normal hearing and no drainage  -- Neck: Normal range of motion. Neck supple. No masses, trachea midline  -- Cardiac: Normal rate, regular rhythm and normal heart sounds  -- Pulmonary: Normal respiratory effort, breath sounds clear to auscultation  -- Abdominal: Soft, no tenderness. Normal bowel sounds. Normal liver edge  -- Musculoskeletal: Normal range of motion, no effusions. Joints stable   -- Neurological: No focal deficits. Showed good interaction with staff    Emergency Room Course      Treatment and Evaluation  --  mg Solumedrol given today in the ER    Diagnosis  -- The encounter diagnosis was Acute nasopharyngitis.    Disposition and Plan  -- Disposition: home  -- Condition: stable  -- Follow-up: Patient to follow up with Claritza Monreal NP in 1-2 days.  -- I advised the patient that we have found no life threatening condition today  -- At this time, I believe the patient is clinically stable for discharge.   -- The patient acknowledges that close follow up with a MD is required   -- Patient agrees to comply with all instruction and direction given in the ER    This note is dictated on M*Modal word recognition program.  There are word recognition mistakes that are occasionally missed on review.         Florin Choudahry MD  01/01/19 6308

## 2019-03-02 ENCOUNTER — HOSPITAL ENCOUNTER (EMERGENCY)
Facility: HOSPITAL | Age: 49
Discharge: HOME | End: 2019-03-02
Attending: SURGERY
Payer: MEDICAID

## 2019-03-02 VITALS
DIASTOLIC BLOOD PRESSURE: 76 MMHG | BODY MASS INDEX: 27.29 KG/M2 | WEIGHT: 164 LBS | SYSTOLIC BLOOD PRESSURE: 124 MMHG | RESPIRATION RATE: 16 BRPM | HEART RATE: 82 BPM | TEMPERATURE: 98 F | OXYGEN SATURATION: 99 %

## 2019-03-02 DIAGNOSIS — G89.29 CHRONIC DENTAL PAIN: Primary | ICD-10-CM

## 2019-03-02 DIAGNOSIS — K08.9 CHRONIC DENTAL PAIN: Primary | ICD-10-CM

## 2019-03-02 PROCEDURE — 63600175 PHARM REV CODE 636 W HCPCS: Performed by: SURGERY

## 2019-03-02 PROCEDURE — 96372 THER/PROPH/DIAG INJ SC/IM: CPT

## 2019-03-02 PROCEDURE — 99284 EMERGENCY DEPT VISIT MOD MDM: CPT | Mod: 25

## 2019-03-02 RX ORDER — IBUPROFEN 800 MG/1
800 TABLET ORAL EVERY 6 HOURS PRN
Qty: 20 TABLET | Refills: 0 | Status: ON HOLD | OUTPATIENT
Start: 2019-03-02 | End: 2019-05-08

## 2019-03-02 RX ORDER — ONDANSETRON 2 MG/ML
4 INJECTION INTRAMUSCULAR; INTRAVENOUS
Status: COMPLETED | OUTPATIENT
Start: 2019-03-02 | End: 2019-03-02

## 2019-03-02 RX ORDER — MORPHINE SULFATE 2 MG/ML
2 INJECTION, SOLUTION INTRAMUSCULAR; INTRAVENOUS
Status: COMPLETED | OUTPATIENT
Start: 2019-03-02 | End: 2019-03-02

## 2019-03-02 RX ORDER — AMOXICILLIN 875 MG/1
875 TABLET, FILM COATED ORAL 2 TIMES DAILY
Qty: 14 TABLET | Refills: 0 | Status: SHIPPED | OUTPATIENT
Start: 2019-03-02 | End: 2019-03-09

## 2019-03-02 RX ADMIN — ONDANSETRON 4 MG: 2 INJECTION INTRAMUSCULAR; INTRAVENOUS at 05:03

## 2019-03-02 RX ADMIN — PENICILLIN G BENZATHINE 1.2 MILLION UNITS: 1200000 INJECTION, SUSPENSION INTRAMUSCULAR at 05:03

## 2019-03-02 RX ADMIN — MORPHINE SULFATE 2 MG: 2 INJECTION, SOLUTION INTRAMUSCULAR; INTRAVENOUS at 05:03

## 2019-03-02 NOTE — ED PROVIDER NOTES
[facility]                                                 Chief Complaint  49 y.o. female with Dental Pain    History of Present Illness  Ami Kamara presents to the emergency room with chronic dental pain  Patient on exam has upper and lower molar cavities with no facial swelling noted  Patient has no signs of dental abscess, no fever, no complication on ER evaluation  Patient states that she has 5/10 pain with any chewing meals over the last week now    The history is provided by the patient   device was not used during this ER visit  Past medical history: Anxiety  Past surgical history  and tubal ligation  No Known Allergies   Family history: Hypertension & seizures    Review of Systems and Physical Exam      Review of Systems  -- Constitution - no fever, denies fatigue, no weakness, no chills  -- Eyes - no tearing or redness, no visual disturbance  -- Ear, Nose - no tinnitus or earache, no nasal congestion or discharge  -- Mouth,Throat - toothache, normal voice, normal swallowing  -- Respiratory - denies cough and congestion, no shortness of breath, no JACOB  -- Cardiovascular - denies chest pain, no palpitations, denies claudication  -- Gastrointestinal - denies abdominal pain, nausea, vomiting, or diarrhea  -- Musculoskeletal - denies back pain, negative for trauma or injury  -- Neurological - no headache, denies weakness or seizure; no LOC  -- Skin - denies pallor, rash, or changes in skin. no hives or welts noted    Vital Signs  Her blood pressure is 124/76 and her pulse is 82.   Her respiration is 16 and oxygen saturation is 99%.     Physical Exam  -- Nursing note and vitals reviewed  -- Constitutional: Appears well-developed and well-nourished  -- Head: Atraumatic. Normocephalic. No obvious abnormality  -- Eyes: Pupils are equal and reactive to light. Normal conjunctiva and lids  -- Nose: Nose normal in appearance, nares grossly normal. No  discharge  -- Throat: several upper and lower bilateral molar cavities with no facial swelling     -- Ears: External ears and TM normal bilaterally. Normal hearing and no drainage  -- Neck: Normal range of motion. Neck supple. No masses, trachea midline  -- Cardiac: Normal rate, regular rhythm and normal heart sounds  -- Pulmonary: Normal respiratory effort, breath sounds clear to auscultation  -- Abdominal: Soft, no tenderness. Normal bowel sounds. Normal liver edge  -- Musculoskeletal: Normal range of motion, no effusions. Joints stable   -- Neurological: No focal deficits. Showed good interaction with staff    Emergency Room Course      Medications Given  morphine injection 2 mg (2 mg Intramuscular Given 3/2/19 0540)   ondansetron injection 4 mg (4 mg Intramuscular Given 3/2/19 0540)   penicillin G benzathine (BICILLIN LA) injection 1.2 Million Units      Diagnosis  -- The encounter diagnosis was Chronic dental pain.    Disposition and Plan  -- Disposition: to dentist ASAP  -- Condition: stable  -- Pt given instructions; take all medications prescribed in the ER as directed.   -- Patient agrees to comply with all instructions- needs appropriate dental care  -- Pt agrees to return to ER if any symptoms reoccur; symptom-free on discharge  -- Patient to follow up with a dentist in 1-2 days. Has been given follow up information  -- The patient acknowledges that dental follow up is required for this issue     This note is dictated on M*Modal word recognition program.  There are word recognition mistakes that are occasionally missed on review.         Florin Choudhary MD  03/02/19 0751

## 2019-03-20 ENCOUNTER — HOSPITAL ENCOUNTER (EMERGENCY)
Facility: HOSPITAL | Age: 49
Discharge: HOME | End: 2019-03-20
Attending: INTERNAL MEDICINE
Payer: MEDICAID

## 2019-03-20 VITALS
OXYGEN SATURATION: 100 % | RESPIRATION RATE: 18 BRPM | BODY MASS INDEX: 26.87 KG/M2 | DIASTOLIC BLOOD PRESSURE: 80 MMHG | TEMPERATURE: 98 F | SYSTOLIC BLOOD PRESSURE: 142 MMHG | HEART RATE: 68 BPM | WEIGHT: 161.25 LBS | HEIGHT: 65 IN

## 2019-03-20 DIAGNOSIS — N30.01 ACUTE CYSTITIS WITH HEMATURIA: Primary | ICD-10-CM

## 2019-03-20 DIAGNOSIS — A59.9 TRICHOMONAS VAGINALIS INFECTION: ICD-10-CM

## 2019-03-20 LAB
ALBUMIN SERPL BCP-MCNC: 4.1 G/DL
ALP SERPL-CCNC: 83 U/L
ALT SERPL W/O P-5'-P-CCNC: 9 U/L
AMPHET+METHAMPHET UR QL: NEGATIVE
ANION GAP SERPL CALC-SCNC: 6 MMOL/L
AST SERPL-CCNC: 10 U/L
B-HCG UR QL: NEGATIVE
BACTERIA #/AREA URNS HPF: ABNORMAL /HPF
BARBITURATES UR QL SCN>200 NG/ML: NEGATIVE
BASOPHILS # BLD AUTO: 0.01 K/UL
BASOPHILS NFR BLD: 0.1 %
BENZODIAZ UR QL SCN>200 NG/ML: NEGATIVE
BILIRUB SERPL-MCNC: 0.3 MG/DL
BILIRUB UR QL STRIP: NEGATIVE
BUN SERPL-MCNC: 10 MG/DL
BZE UR QL SCN: NEGATIVE
CALCIUM SERPL-MCNC: 9.6 MG/DL
CANNABINOIDS UR QL SCN: NEGATIVE
CHLORIDE SERPL-SCNC: 111 MMOL/L
CLARITY UR: CLEAR
CO2 SERPL-SCNC: 23 MMOL/L
COLOR UR: YELLOW
CREAT SERPL-MCNC: 0.9 MG/DL
CREAT UR-MCNC: 124.2 MG/DL
DIFFERENTIAL METHOD: ABNORMAL
EOSINOPHIL # BLD AUTO: 0.1 K/UL
EOSINOPHIL NFR BLD: 1.3 %
ERYTHROCYTE [DISTWIDTH] IN BLOOD BY AUTOMATED COUNT: 13.2 %
EST. GFR  (AFRICAN AMERICAN): >60 ML/MIN/1.73 M^2
EST. GFR  (NON AFRICAN AMERICAN): >60 ML/MIN/1.73 M^2
GLUCOSE SERPL-MCNC: 115 MG/DL
GLUCOSE UR QL STRIP: NEGATIVE
HCT VFR BLD AUTO: 34.9 %
HGB BLD-MCNC: 11.9 G/DL
HGB UR QL STRIP: ABNORMAL
KETONES UR QL STRIP: NEGATIVE
LEUKOCYTE ESTERASE UR QL STRIP: ABNORMAL
LIPASE SERPL-CCNC: 39 U/L
LYMPHOCYTES # BLD AUTO: 1.4 K/UL
LYMPHOCYTES NFR BLD: 17.5 %
MCH RBC QN AUTO: 26.9 PG
MCHC RBC AUTO-ENTMCNC: 34.1 G/DL
MCV RBC AUTO: 79 FL
METHADONE UR QL SCN>300 NG/ML: NEGATIVE
MICROSCOPIC COMMENT: ABNORMAL
MONOCYTES # BLD AUTO: 0.4 K/UL
MONOCYTES NFR BLD: 5 %
NEUTROPHILS # BLD AUTO: 6.3 K/UL
NEUTROPHILS NFR BLD: 76.1 %
NITRITE UR QL STRIP: NEGATIVE
OPIATES UR QL SCN: NEGATIVE
PCP UR QL SCN>25 NG/ML: NEGATIVE
PH UR STRIP: 5 [PH] (ref 5–8)
PLATELET # BLD AUTO: 257 K/UL
PMV BLD AUTO: 10.5 FL
POTASSIUM SERPL-SCNC: 4.2 MMOL/L
PROT SERPL-MCNC: 7.1 G/DL
PROT UR QL STRIP: NEGATIVE
RBC # BLD AUTO: 4.42 M/UL
RBC #/AREA URNS HPF: >100 /HPF (ref 0–4)
SODIUM SERPL-SCNC: 140 MMOL/L
SP GR UR STRIP: 1.02 (ref 1–1.03)
TOXICOLOGY INFORMATION: NORMAL
TRICHOMONAS UR QL MICRO: ABNORMAL
URN SPEC COLLECT METH UR: ABNORMAL
UROBILINOGEN UR STRIP-ACNC: NEGATIVE EU/DL
WBC # BLD AUTO: 8.25 K/UL
WBC #/AREA URNS HPF: 8 /HPF (ref 0–5)

## 2019-03-20 PROCEDURE — 99284 EMERGENCY DEPT VISIT MOD MDM: CPT | Mod: 25

## 2019-03-20 PROCEDURE — 80307 DRUG TEST PRSMV CHEM ANLYZR: CPT

## 2019-03-20 PROCEDURE — 80053 COMPREHEN METABOLIC PANEL: CPT

## 2019-03-20 PROCEDURE — 63600175 PHARM REV CODE 636 W HCPCS: Performed by: INTERNAL MEDICINE

## 2019-03-20 PROCEDURE — 81000 URINALYSIS NONAUTO W/SCOPE: CPT | Mod: 59

## 2019-03-20 PROCEDURE — 25000003 PHARM REV CODE 250: Performed by: INTERNAL MEDICINE

## 2019-03-20 PROCEDURE — 96372 THER/PROPH/DIAG INJ SC/IM: CPT

## 2019-03-20 PROCEDURE — 81025 URINE PREGNANCY TEST: CPT

## 2019-03-20 PROCEDURE — 85025 COMPLETE CBC W/AUTO DIFF WBC: CPT

## 2019-03-20 PROCEDURE — 83690 ASSAY OF LIPASE: CPT

## 2019-03-20 PROCEDURE — 36415 COLL VENOUS BLD VENIPUNCTURE: CPT

## 2019-03-20 RX ORDER — HYDROCODONE BITARTRATE AND ACETAMINOPHEN 5; 325 MG/1; MG/1
1 TABLET ORAL
Status: COMPLETED | OUTPATIENT
Start: 2019-03-20 | End: 2019-03-20

## 2019-03-20 RX ORDER — CIPROFLOXACIN 500 MG/1
500 TABLET ORAL 2 TIMES DAILY
Qty: 14 TABLET | Refills: 0 | Status: SHIPPED | OUTPATIENT
Start: 2019-03-20 | End: 2019-03-27

## 2019-03-20 RX ORDER — METRONIDAZOLE 500 MG/1
2000 TABLET ORAL
Status: COMPLETED | OUTPATIENT
Start: 2019-03-20 | End: 2019-03-20

## 2019-03-20 RX ORDER — KETOROLAC TROMETHAMINE 30 MG/ML
30 INJECTION, SOLUTION INTRAMUSCULAR; INTRAVENOUS
Status: COMPLETED | OUTPATIENT
Start: 2019-03-20 | End: 2019-03-20

## 2019-03-20 RX ORDER — CIPROFLOXACIN 500 MG/1
500 TABLET ORAL
Status: COMPLETED | OUTPATIENT
Start: 2019-03-20 | End: 2019-03-20

## 2019-03-20 RX ADMIN — LIDOCAINE HYDROCHLORIDE 50 ML: 20 SOLUTION ORAL; TOPICAL at 04:03

## 2019-03-20 RX ADMIN — CIPROFLOXACIN 500 MG: 500 TABLET, FILM COATED ORAL at 05:03

## 2019-03-20 RX ADMIN — KETOROLAC TROMETHAMINE 30 MG: 30 INJECTION INTRAMUSCULAR; INTRAVENOUS at 05:03

## 2019-03-20 RX ADMIN — HYDROCODONE BITARTRATE AND ACETAMINOPHEN 1 TABLET: 5; 325 TABLET ORAL at 05:03

## 2019-03-20 RX ADMIN — METRONIDAZOLE 2000 MG: 500 TABLET ORAL at 05:03

## 2019-03-20 NOTE — ED PROVIDER NOTES
Encounter Date: 3/20/2019       History     Chief Complaint   Patient presents with    Abdominal Pain     and back pain starting tonight; no N/V/D     Pt was awakened from sleep at 3 a.m. By crampy lower abdominal pain that radiates to her back. The pain is constant and worse if she lies down, and better if she sits up. She denies any previous episodes. She relates mild dysuria. Her BM are normal; her LMP was earlier this month, and she is on depo-provera for contraception. She denies ETOH or drug abuse.        Review of patient's allergies indicates:  No Known Allergies  Past Medical History:   Diagnosis Date    Anxiety     GERD (gastroesophageal reflux disease)      Past Surgical History:   Procedure Laterality Date     SECTION      x2    TUBAL LIGATION       Family History   Problem Relation Age of Onset    Hypertension Mother     Seizures Mother     Hypertension Father      Social History     Tobacco Use    Smoking status: Current Every Day Smoker     Packs/day: 2.00     Years: 26.00     Pack years: 52.00    Smokeless tobacco: Never Used   Substance Use Topics    Alcohol use: Yes     Comment: OCCASSIONALLY    Drug use: No     Review of Systems   All other systems reviewed and are negative.      Physical Exam     Initial Vitals [19 0308]   BP Pulse Resp Temp SpO2   (!) 140/71 71 16 96.7 °F (35.9 °C) 100 %      MAP       --         Physical Exam    Nursing note and vitals reviewed.  Constitutional: She appears well-developed and well-nourished.   HENT:   Head: Normocephalic and atraumatic.   Mouth/Throat: Oropharynx is clear and moist.   Eyes: Conjunctivae and EOM are normal. Pupils are equal, round, and reactive to light.   Neck: Normal range of motion. Neck supple.   Cardiovascular: Normal rate, regular rhythm, normal heart sounds and intact distal pulses. Exam reveals no friction rub.    No murmur heard.  Pulmonary/Chest: Breath sounds normal.   Abdominal: Soft. Bowel sounds are  normal. She exhibits no distension. There is tenderness. There is no rebound.   Mild suprapubic tenderness is noted on palpation.   Musculoskeletal: Normal range of motion. She exhibits no edema or tenderness.   Neurological: She is alert and oriented to person, place, and time. She has normal strength.   Skin: Skin is warm and dry. Capillary refill takes less than 2 seconds.         ED Course   Procedures  Labs Reviewed - No data to display       Imaging Results    None          Medical Decision Making:   Initial Assessment:   Pt presents with suprapubic tenderness x several hours  Differential Diagnosis:   UTI  Trichomonas vaginalis infection                      Clinical Impression:       ICD-10-CM ICD-9-CM   1. Acute cystitis with hematuria N30.01 595.0   2. Trichomonas vaginalis infection A59.9 131.01         Disposition:   Disposition: Discharged  Condition: Stable                        Anca Ragland MD  03/27/19 9305

## 2019-03-20 NOTE — ED TRIAGE NOTES
49 y.o. female presents to ER ED 02/ED 02A   Chief Complaint   Patient presents with    Abdominal Pain     and back pain starting tonight; no N/V/D   . No acute distress noted.

## 2019-04-29 PROBLEM — R10.13 EPIGASTRIC PAIN: Status: ACTIVE | Noted: 2019-04-29

## 2019-04-29 PROBLEM — Z12.11 SCREENING FOR COLON CANCER: Status: ACTIVE | Noted: 2019-04-29

## 2019-05-16 ENCOUNTER — HOSPITAL ENCOUNTER (EMERGENCY)
Facility: HOSPITAL | Age: 49
Discharge: LEFT BEFORE DISCHARGE | End: 2019-05-17
Attending: SURGERY
Payer: MEDICAID

## 2019-05-16 VITALS
OXYGEN SATURATION: 100 % | HEART RATE: 88 BPM | DIASTOLIC BLOOD PRESSURE: 65 MMHG | RESPIRATION RATE: 18 BRPM | TEMPERATURE: 99 F | WEIGHT: 156.06 LBS | SYSTOLIC BLOOD PRESSURE: 163 MMHG | BODY MASS INDEX: 27.65 KG/M2

## 2019-05-16 DIAGNOSIS — M54.50 RIGHT-SIDED LOW BACK PAIN WITHOUT SCIATICA, UNSPECIFIED CHRONICITY: ICD-10-CM

## 2019-05-16 DIAGNOSIS — Z53.29 LEFT AGAINST MEDICAL ADVICE: Primary | ICD-10-CM

## 2019-05-16 LAB
ALBUMIN SERPL BCP-MCNC: 3.6 G/DL (ref 3.5–5.2)
ALP SERPL-CCNC: 52 U/L (ref 55–135)
ALT SERPL W/O P-5'-P-CCNC: 11 U/L (ref 10–44)
AMPHET+METHAMPHET UR QL: NEGATIVE
ANION GAP SERPL CALC-SCNC: 6 MMOL/L (ref 8–16)
AST SERPL-CCNC: 11 U/L (ref 10–40)
B-HCG UR QL: NEGATIVE
BACTERIA #/AREA URNS HPF: ABNORMAL /HPF
BARBITURATES UR QL SCN>200 NG/ML: NEGATIVE
BASOPHILS # BLD AUTO: 0.01 K/UL (ref 0–0.2)
BASOPHILS NFR BLD: 0.2 % (ref 0–1.9)
BENZODIAZ UR QL SCN>200 NG/ML: NEGATIVE
BILIRUB SERPL-MCNC: 0.6 MG/DL (ref 0.1–1)
BILIRUB UR QL STRIP: NEGATIVE
BUN SERPL-MCNC: 10 MG/DL (ref 6–20)
BZE UR QL SCN: NEGATIVE
CALCIUM SERPL-MCNC: 8.9 MG/DL (ref 8.7–10.5)
CANNABINOIDS UR QL SCN: NEGATIVE
CHLORIDE SERPL-SCNC: 111 MMOL/L (ref 95–110)
CLARITY UR: CLEAR
CO2 SERPL-SCNC: 24 MMOL/L (ref 23–29)
COLOR UR: YELLOW
CREAT SERPL-MCNC: 1 MG/DL (ref 0.5–1.4)
CREAT UR-MCNC: 128 MG/DL (ref 15–325)
DIFFERENTIAL METHOD: ABNORMAL
EOSINOPHIL # BLD AUTO: 0.2 K/UL (ref 0–0.5)
EOSINOPHIL NFR BLD: 3.2 % (ref 0–8)
ERYTHROCYTE [DISTWIDTH] IN BLOOD BY AUTOMATED COUNT: 13.6 % (ref 11.5–14.5)
EST. GFR  (AFRICAN AMERICAN): >60 ML/MIN/1.73 M^2
EST. GFR  (NON AFRICAN AMERICAN): >60 ML/MIN/1.73 M^2
GLUCOSE SERPL-MCNC: 89 MG/DL (ref 70–110)
GLUCOSE UR QL STRIP: NEGATIVE
HCT VFR BLD AUTO: 39.3 % (ref 37–48.5)
HGB BLD-MCNC: 13.1 G/DL (ref 12–16)
HGB UR QL STRIP: ABNORMAL
KETONES UR QL STRIP: NEGATIVE
LEUKOCYTE ESTERASE UR QL STRIP: ABNORMAL
LIPASE SERPL-CCNC: 20 U/L (ref 4–60)
LYMPHOCYTES # BLD AUTO: 2.4 K/UL (ref 1–4.8)
LYMPHOCYTES NFR BLD: 39.8 % (ref 18–48)
MCH RBC QN AUTO: 26.6 PG (ref 27–31)
MCHC RBC AUTO-ENTMCNC: 33.3 G/DL (ref 32–36)
MCV RBC AUTO: 80 FL (ref 82–98)
METHADONE UR QL SCN>300 NG/ML: NEGATIVE
MICROSCOPIC COMMENT: ABNORMAL
MONOCYTES # BLD AUTO: 0.4 K/UL (ref 0.3–1)
MONOCYTES NFR BLD: 5.9 % (ref 4–15)
NEUTROPHILS # BLD AUTO: 3 K/UL (ref 1.8–7.7)
NEUTROPHILS NFR BLD: 50.9 % (ref 38–73)
NITRITE UR QL STRIP: NEGATIVE
OPIATES UR QL SCN: NEGATIVE
PCP UR QL SCN>25 NG/ML: NEGATIVE
PH UR STRIP: 6 [PH] (ref 5–8)
PLATELET # BLD AUTO: 220 K/UL (ref 150–350)
PMV BLD AUTO: 10.7 FL (ref 9.2–12.9)
POTASSIUM SERPL-SCNC: 3.9 MMOL/L (ref 3.5–5.1)
PROT SERPL-MCNC: 6.1 G/DL (ref 6–8.4)
PROT UR QL STRIP: NEGATIVE
RBC # BLD AUTO: 4.92 M/UL (ref 4–5.4)
RBC #/AREA URNS HPF: 5 /HPF (ref 0–4)
SODIUM SERPL-SCNC: 141 MMOL/L (ref 136–145)
SP GR UR STRIP: 1.02 (ref 1–1.03)
TOXICOLOGY INFORMATION: NORMAL
URN SPEC COLLECT METH UR: ABNORMAL
UROBILINOGEN UR STRIP-ACNC: NEGATIVE EU/DL
WBC # BLD AUTO: 5.95 K/UL (ref 3.9–12.7)
WBC #/AREA URNS HPF: 20 /HPF (ref 0–5)

## 2019-05-16 PROCEDURE — 63600175 PHARM REV CODE 636 W HCPCS: Performed by: NURSE PRACTITIONER

## 2019-05-16 PROCEDURE — 87086 URINE CULTURE/COLONY COUNT: CPT

## 2019-05-16 PROCEDURE — 99284 EMERGENCY DEPT VISIT MOD MDM: CPT

## 2019-05-16 PROCEDURE — 36415 COLL VENOUS BLD VENIPUNCTURE: CPT

## 2019-05-16 PROCEDURE — 80307 DRUG TEST PRSMV CHEM ANLYZR: CPT

## 2019-05-16 PROCEDURE — 96372 THER/PROPH/DIAG INJ SC/IM: CPT | Mod: 59

## 2019-05-16 PROCEDURE — 85025 COMPLETE CBC W/AUTO DIFF WBC: CPT

## 2019-05-16 PROCEDURE — 80053 COMPREHEN METABOLIC PANEL: CPT

## 2019-05-16 PROCEDURE — 81000 URINALYSIS NONAUTO W/SCOPE: CPT | Mod: 59

## 2019-05-16 PROCEDURE — 83690 ASSAY OF LIPASE: CPT

## 2019-05-16 PROCEDURE — 81025 URINE PREGNANCY TEST: CPT

## 2019-05-16 RX ORDER — KETOROLAC TROMETHAMINE 30 MG/ML
60 INJECTION, SOLUTION INTRAMUSCULAR; INTRAVENOUS
Status: COMPLETED | OUTPATIENT
Start: 2019-05-16 | End: 2019-05-16

## 2019-05-16 RX ADMIN — KETOROLAC TROMETHAMINE 60 MG: 30 INJECTION, SOLUTION INTRAMUSCULAR at 06:05

## 2019-05-16 NOTE — ED PROVIDER NOTES
"Encounter Date: 2019       History     Chief Complaint   Patient presents with    Back Pain     new onset, denies injury, states "i woke up with my back hurting". pt indicates left flank area. rated 9/10. nothing taken for pain pta.     The history is provided by the patient.   Back Pain    This is a new problem. The current episode started today. The problem occurs constantly. The problem has been waxing and waning. The pain is associated with no known injury. The pain is present in the lumbar spine. The quality of the pain is described as shooting and aching. The pain does not radiate. The pain is at a severity of 5/10. The symptoms are aggravated by bending and twisting. Pertinent negatives include no chest pain, no fever, no numbness, no weight loss, no headaches, no abdominal pain, no abdominal swelling, no bowel incontinence, no perianal numbness, no bladder incontinence, no dysuria, no pelvic pain, no leg pain, no paresthesias, no paresis, no tingling and no weakness. She has tried nothing for the symptoms.     Review of patient's allergies indicates:  No Known Allergies  Past Medical History:   Diagnosis Date    Anxiety     GERD (gastroesophageal reflux disease)     Seizures      Past Surgical History:   Procedure Laterality Date     SECTION      x2    COLONOSCOPY N/A 2019    Performed [facility]    EGD (ESOPHAGOGASTRODUODENOSCOPY) N/A 2019    Performed [facility] at [facility]    TUBAL LIGATION       Family History   Problem Relation Age of Onset    Hypertension Mother     Seizures Mother     Hypertension Father      Social History     Tobacco Use    Smoking status: Current Every Day Smoker     Packs/day: 0.50     Years: 26.00     Pack years: 13.00     Types: Cigarettes    Smokeless tobacco: Never Used   Substance Use Topics    Alcohol use: Yes     Comment: occ    Drug use: No     Types: Marijuana     Review of Systems "   Constitutional: Negative.  Negative for activity change, chills, fever and weight loss.   HENT: Negative.  Negative for congestion, ear discharge, ear pain, postnasal drip, sinus pressure, sinus pain and sore throat.    Eyes: Negative.    Respiratory: Negative.  Negative for cough, chest tightness and shortness of breath.    Cardiovascular: Negative.  Negative for chest pain.   Gastrointestinal: Negative.  Negative for abdominal distention, abdominal pain, bowel incontinence and nausea.   Endocrine: Negative.    Genitourinary: Positive for flank pain. Negative for bladder incontinence, dysuria, frequency, pelvic pain and urgency.        Left flank pain.   Musculoskeletal: Positive for back pain.        Lumbar pain, greater on the left.  Denies numbness or tingling to bilateral lower extremities.  Denies bowel or bladder dysfunction.   Skin: Negative.  Negative for rash.   Allergic/Immunologic: Negative.    Neurological: Negative.  Negative for dizziness, tingling, weakness, light-headedness, numbness, headaches and paresthesias.   Hematological: Negative.  Does not bruise/bleed easily.   Psychiatric/Behavioral: Negative.        Physical Exam     Initial Vitals [05/16/19 1737]   BP Pulse Resp Temp SpO2   (!) 163/65 88 18 98.9 °F (37.2 °C) 100 %      MAP       --         Physical Exam    Nursing note and vitals reviewed.  Constitutional: She appears well-developed and well-nourished.   HENT:   Head: Normocephalic and atraumatic.   Right Ear: External ear normal.   Left Ear: External ear normal.   Nose: Nose normal.   Mouth/Throat: Oropharynx is clear and moist.   Eyes: Conjunctivae and EOM are normal. Pupils are equal, round, and reactive to light.   Neck: Normal range of motion. Neck supple.   Cardiovascular: Normal rate, regular rhythm, normal heart sounds and intact distal pulses.   Pulmonary/Chest: Effort normal and breath sounds normal. She has no decreased breath sounds. She has no wheezes. She has no rhonchi.  She has no rales.   Abdominal: Soft. Bowel sounds are normal. There is no tenderness.   Musculoskeletal: Normal range of motion.        Lumbar back: She exhibits tenderness and bony tenderness. She exhibits no swelling, no edema and no deformity.   Neurological: She is alert and oriented to person, place, and time. She has normal strength. She displays normal reflexes. No cranial nerve deficit or sensory deficit.   Skin: Skin is warm and dry. Capillary refill takes less than 2 seconds. No rash noted.   Psychiatric: She has a normal mood and affect. Her behavior is normal. Judgment and thought content normal.         ED Course   Procedures  Labs Reviewed   CBC W/ AUTO DIFFERENTIAL - Abnormal; Notable for the following components:       Result Value    Mean Corpuscular Volume 80 (*)     Mean Corpuscular Hemoglobin 26.6 (*)     All other components within normal limits   COMPREHENSIVE METABOLIC PANEL   LIPASE   URINALYSIS, REFLEX TO URINE CULTURE   PREGNANCY TEST, URINE RAPID   DRUG SCREEN PANEL, URINE EMERGENCY          Imaging Results    None           Medications   ketorolac injection 60 mg (60 mg Intramuscular Given 5/16/19 1840)                          Clinical Impression:       ICD-10-CM ICD-9-CM   1. Left against medical advice Z53.20 V64.2   2. Right-sided low back pain without sciatica, unspecified chronicity M54.5 724.2         Disposition:   Disposition: AMA  Condition: Stable       Patient left against medical advice before the completion of her workup                 Florin Choudhary MD  05/16/19 2615

## 2019-05-16 NOTE — ED TRIAGE NOTES
"49 y.o. female presents to ER   Chief Complaint   Patient presents with    Back Pain     new onset, denies injury, states "i woke up with my back hurting". pt indicates left flank area. rated 9/10. nothing taken for pain pta.   . No acute distress noted.  "

## 2019-05-17 NOTE — ED NOTES
The patient is awake, alert. Normal respiratory effort and rate noted, skin warm and dry, full ROM in all extremities, no change from previous assessment.  Bed in low, locked position. Pt able to change position independently. Will continue to monitor.

## 2019-05-17 NOTE — ED NOTES
The patient is awake, alert and cooperative with a calm affect, patient is aware of environment. Respirations are spontaneous, normal respiratory effort and rate noted, full ROM in all extremities, no distress noted, resting comfortably. No change from previous assessment. Bed in low, locked position. Pt able to change position independently. Will continue to monitor.

## 2019-05-18 LAB
BACTERIA UR CULT: NORMAL
BACTERIA UR CULT: NORMAL

## 2020-03-11 ENCOUNTER — HOSPITAL ENCOUNTER (EMERGENCY)
Facility: HOSPITAL | Age: 50
Discharge: HOME | End: 2020-03-11
Attending: SURGERY
Payer: MEDICAID

## 2020-03-11 VITALS
BODY MASS INDEX: 28.73 KG/M2 | RESPIRATION RATE: 18 BRPM | DIASTOLIC BLOOD PRESSURE: 85 MMHG | WEIGHT: 172.63 LBS | OXYGEN SATURATION: 98 % | SYSTOLIC BLOOD PRESSURE: 124 MMHG | HEART RATE: 90 BPM | TEMPERATURE: 97 F

## 2020-03-11 DIAGNOSIS — M54.42 ACUTE LEFT-SIDED LOW BACK PAIN WITH LEFT-SIDED SCIATICA: Primary | ICD-10-CM

## 2020-03-11 PROCEDURE — 99284 EMERGENCY DEPT VISIT MOD MDM: CPT | Mod: 25

## 2020-03-11 PROCEDURE — 25000003 PHARM REV CODE 250: Performed by: NURSE PRACTITIONER

## 2020-03-11 PROCEDURE — 96372 THER/PROPH/DIAG INJ SC/IM: CPT

## 2020-03-11 PROCEDURE — 63600175 PHARM REV CODE 636 W HCPCS: Performed by: NURSE PRACTITIONER

## 2020-03-11 RX ORDER — METHYLPREDNISOLONE SOD SUCC 125 MG
125 VIAL (EA) INJECTION
Status: COMPLETED | OUTPATIENT
Start: 2020-03-11 | End: 2020-03-11

## 2020-03-11 RX ORDER — CYCLOBENZAPRINE HCL 10 MG
10 TABLET ORAL
Status: COMPLETED | OUTPATIENT
Start: 2020-03-11 | End: 2020-03-11

## 2020-03-11 RX ORDER — NAPROXEN 500 MG/1
500 TABLET ORAL EVERY 12 HOURS PRN
Qty: 10 TABLET | Refills: 0 | Status: SHIPPED | OUTPATIENT
Start: 2020-03-11 | End: 2020-04-15 | Stop reason: ALTCHOICE

## 2020-03-11 RX ORDER — CYCLOBENZAPRINE HCL 10 MG
10 TABLET ORAL EVERY 8 HOURS PRN
Qty: 12 TABLET | Refills: 0 | Status: SHIPPED | OUTPATIENT
Start: 2020-03-11 | End: 2021-02-11 | Stop reason: SDUPTHER

## 2020-03-11 RX ORDER — KETOROLAC TROMETHAMINE 30 MG/ML
60 INJECTION, SOLUTION INTRAMUSCULAR; INTRAVENOUS
Status: COMPLETED | OUTPATIENT
Start: 2020-03-11 | End: 2020-03-11

## 2020-03-11 RX ADMIN — METHYLPREDNISOLONE SODIUM SUCCINATE 125 MG: 125 INJECTION, POWDER, FOR SOLUTION INTRAMUSCULAR; INTRAVENOUS at 11:03

## 2020-03-11 RX ADMIN — KETOROLAC TROMETHAMINE 60 MG: 30 INJECTION, SOLUTION INTRAMUSCULAR at 11:03

## 2020-03-11 RX ADMIN — CYCLOBENZAPRINE 10 MG: 10 TABLET, FILM COATED ORAL at 11:03

## 2020-03-11 NOTE — ED PROVIDER NOTES
Encounter Date: 3/11/2020       History     Chief Complaint   Patient presents with    Back Pain     The history is provided by the patient.   Back Pain    This is a new problem. The current episode started yesterday. The problem occurs constantly. The problem has been unchanged. The pain is associated with lifting heavy objects. The pain is present in the lumbar spine. The quality of the pain is described as shooting. The pain radiates to the left leg. The symptoms are aggravated by bending, twisting and certain positions. Associated symptoms include leg pain. Pertinent negatives include no chest pain, no fever, no numbness, no headaches, no abdominal pain, no bowel incontinence, no bladder incontinence, no dysuria, no tingling and no weakness.     Review of patient's allergies indicates:  No Known Allergies  Past Medical History:   Diagnosis Date    Anxiety     GERD (gastroesophageal reflux disease)     Seizures      Past Surgical History:   Procedure Laterality Date     SECTION      x2    COLONOSCOPY N/A 2019    Procedure: COLONOSCOPY;  Surgeon: Geovani Willard MD;  Location: [facility];  Service: Endoscopy;  Laterality: N/A;    ESOPHAGOGASTRODUODENOSCOPY N/A 2019    Procedure: EGD (ESOPHAGOGASTRODUODENOSCOPY);  Surgeon: Geovani Willard MD;  Location: [facility];  Service: Endoscopy;  Laterality: N/A;    TUBAL LIGATION       Family History   Problem Relation Age of Onset    Hypertension Mother     Seizures Mother     Hypertension Father      Social History     Tobacco Use    Smoking status: Current Every Day Smoker     Packs/day: 0.50     Years: 26.00     Pack years: 13.00     Types: Cigarettes    Smokeless tobacco: Never Used   Substance Use Topics    Alcohol use: Yes     Comment: occ    Drug use: No     Types: Marijuana     Comment: past use     Review of Systems   Constitutional: Negative for fever.   HENT: Negative for congestion, ear pain, rhinorrhea, sore throat  and trouble swallowing.    Eyes: Negative for pain and redness.   Respiratory: Negative for cough and shortness of breath.    Cardiovascular: Negative for chest pain.   Gastrointestinal: Negative for abdominal pain and bowel incontinence.   Genitourinary: Negative for bladder incontinence, difficulty urinating and dysuria.   Musculoskeletal: Positive for back pain. Negative for arthralgias, myalgias and neck pain.   Skin: Negative for rash and wound.   Neurological: Negative for tingling, seizures, weakness, numbness and headaches.   Psychiatric/Behavioral: Negative.        Physical Exam     Initial Vitals [03/11/20 1042]   BP Pulse Resp Temp SpO2   124/85 90 18 97.1 °F (36.2 °C) 98 %      MAP       --         Physical Exam    Nursing note and vitals reviewed.  Constitutional: No distress.   HENT:   Head: Normocephalic and atraumatic.   Right Ear: External ear normal.   Left Ear: External ear normal.   Nose: Nose normal.   Mouth/Throat: Oropharynx is clear and moist and mucous membranes are normal.   Eyes: Conjunctivae, EOM and lids are normal. Pupils are equal, round, and reactive to light.   Neck: Neck supple.   Cardiovascular: Normal rate, regular rhythm, S1 normal, S2 normal, normal heart sounds and intact distal pulses.   Pulmonary/Chest: Effort normal and breath sounds normal. No respiratory distress.   Abdominal: Soft. Bowel sounds are normal. There is no tenderness.   Musculoskeletal: Normal range of motion.        Lumbar back: She exhibits tenderness (Left-sided only). She exhibits normal range of motion, no swelling, no edema, no deformity, no laceration and normal pulse.   Neurological: She is alert and oriented to person, place, and time. She has normal strength. GCS eye subscore is 4. GCS verbal subscore is 5. GCS motor subscore is 6.   Skin: Skin is warm and dry. Capillary refill takes less than 2 seconds. No rash noted.   Psychiatric: She has a normal mood and affect. Her speech is normal and behavior  is normal.         ED Course   Procedures  Labs Reviewed - No data to display       Imaging Results          X-Ray Lumbar Spine Ap And Lateral (Final result)  Result time 03/11/20 11:40:29    Final result by Faby Martin MD (03/11/20 11:40:29)                 Impression:      As above.      Electronically signed by: Faby Martin MD  Date:    03/11/2020  Time:    11:40             Narrative:    EXAMINATION:  XR LUMBAR SPINE AP AND LATERAL    CLINICAL HISTORY:  Low back pain, minor trauma;    TECHNIQUE:  AP, lateral and spot images were performed of the lumbar spine.    COMPARISON:  None    FINDINGS:  Levo scoliotic curvature of the lumbar spine.  This is likely somewhat exaggerated by patient positioning.  Vertebral body heights and disc spaces are maintained.  No fracture or subluxation is identified.                                           Medications   ketorolac injection 60 mg (60 mg Intramuscular Given 3/11/20 1143)   cyclobenzaprine tablet 10 mg (10 mg Oral Given 3/11/20 1144)   methylPREDNISolone sodium succinate injection 125 mg (125 mg Intramuscular Given 3/11/20 1142)                              Clinical Impression:       ICD-10-CM ICD-9-CM   1. Acute left-sided low back pain with left-sided sciatica M54.42 724.2     724.3     The patient acknowledges that close follow up with medical provider is required. Instructed to follow up with PCP within 2 days. Patient was given specific return precautions. The patient agrees to comply with all instruction and directions given in the ER.       Disposition:   Disposition: Discharged  Condition: Stable     ED Disposition Condition    Discharge Stable        ED Prescriptions     Medication Sig Dispense Start Date End Date Auth. Provider    naproxen (NAPROSYN) 500 MG tablet Take 1 tablet (500 mg total) by mouth every 12 (twelve) hours as needed (pain). Take with food. 10 tablet 3/11/2020  Catherine I. Alexandra, NP    cyclobenzaprine (FLEXERIL) 10 MG tablet Take 1  tablet (10 mg total) by mouth every 8 (eight) hours as needed for Muscle spasms. 12 tablet 3/11/2020  Catherine Alexandra NP        Follow-up Information     Follow up With Specialties Details Why Contact Info    Claritza Monreal NP Family Medicine Schedule an appointment as soon as possible for a visit in 2 days  [address]  [place] [address]  [phone]                                       Catherine Alexandra NP  03/11/20 1156

## 2020-03-11 NOTE — DISCHARGE INSTRUCTIONS
**Follow up with PCP in 24-48 hours. Return to ER with worsening of symptoms. You may also take over-the-counter Tylenol as directed on package insert as needed for pain.    **Our goal in the emergency department is to always give you outstanding care and exceptional service. You may receive a survey by mail or e-mail in the next week regarding your experience in our ED. We would greatly appreciate your completing and returning the survey. Your feedback provides us with a way to recognize our staff who give very good care and it helps us learn how to improve when your experience was below our aspiration of excellence.

## 2020-10-05 ENCOUNTER — HOSPITAL ENCOUNTER (EMERGENCY)
Facility: HOSPITAL | Age: 50
Discharge: HOME | End: 2020-10-05
Attending: SURGERY
Payer: MEDICAID

## 2020-10-05 VITALS
BODY MASS INDEX: 31.55 KG/M2 | TEMPERATURE: 97 F | SYSTOLIC BLOOD PRESSURE: 135 MMHG | HEART RATE: 86 BPM | RESPIRATION RATE: 16 BRPM | OXYGEN SATURATION: 98 % | DIASTOLIC BLOOD PRESSURE: 74 MMHG | WEIGHT: 189.63 LBS

## 2020-10-05 DIAGNOSIS — K04.7 DENTAL ABSCESS: Primary | ICD-10-CM

## 2020-10-05 PROCEDURE — 96372 THER/PROPH/DIAG INJ SC/IM: CPT

## 2020-10-05 PROCEDURE — 99284 EMERGENCY DEPT VISIT MOD MDM: CPT | Mod: 25

## 2020-10-05 PROCEDURE — 63600175 PHARM REV CODE 636 W HCPCS: Performed by: SURGERY

## 2020-10-05 RX ORDER — ONDANSETRON 2 MG/ML
4 INJECTION INTRAMUSCULAR; INTRAVENOUS
Status: COMPLETED | OUTPATIENT
Start: 2020-10-05 | End: 2020-10-05

## 2020-10-05 RX ORDER — IBUPROFEN 800 MG/1
800 TABLET ORAL EVERY 6 HOURS PRN
Qty: 20 TABLET | Refills: 0 | Status: SHIPPED | OUTPATIENT
Start: 2020-10-05 | End: 2020-11-28 | Stop reason: HOSPADM

## 2020-10-05 RX ORDER — MORPHINE SULFATE 2 MG/ML
2 INJECTION, SOLUTION INTRAMUSCULAR; INTRAVENOUS
Status: COMPLETED | OUTPATIENT
Start: 2020-10-05 | End: 2020-10-05

## 2020-10-05 RX ORDER — AMOXICILLIN 875 MG/1
875 TABLET, FILM COATED ORAL 2 TIMES DAILY
Qty: 14 TABLET | Refills: 0 | Status: SHIPPED | OUTPATIENT
Start: 2020-10-05 | End: 2020-10-12

## 2020-10-05 RX ADMIN — ONDANSETRON 4 MG: 2 INJECTION INTRAMUSCULAR; INTRAVENOUS at 07:10

## 2020-10-05 RX ADMIN — MORPHINE SULFATE 2 MG: 2 INJECTION, SOLUTION INTRAMUSCULAR; INTRAVENOUS at 07:10

## 2020-10-05 RX ADMIN — PENICILLIN G BENZATHINE 1.2 MILLION UNITS: 1200000 INJECTION, SUSPENSION INTRAMUSCULAR at 07:10

## 2020-10-05 NOTE — ED TRIAGE NOTES
50 y.o. female presents to ER   Chief Complaint   Patient presents with    Oral Swelling   pt c/o oral swelling, eye swelling, & dental pain on left side. No acute distress noted.

## 2020-11-28 ENCOUNTER — HOSPITAL ENCOUNTER (EMERGENCY)
Facility: HOSPITAL | Age: 50
Discharge: HOME | End: 2020-11-28
Attending: SURGERY
Payer: MEDICAID

## 2020-11-28 VITALS
TEMPERATURE: 99 F | HEART RATE: 105 BPM | RESPIRATION RATE: 18 BRPM | SYSTOLIC BLOOD PRESSURE: 142 MMHG | BODY MASS INDEX: 31.02 KG/M2 | WEIGHT: 186.38 LBS | OXYGEN SATURATION: 99 % | DIASTOLIC BLOOD PRESSURE: 85 MMHG

## 2020-11-28 DIAGNOSIS — M79.673 FOOT PAIN: Primary | ICD-10-CM

## 2020-11-28 PROCEDURE — 25000003 PHARM REV CODE 250: Performed by: NURSE PRACTITIONER

## 2020-11-28 PROCEDURE — 99283 EMERGENCY DEPT VISIT LOW MDM: CPT | Mod: 25

## 2020-11-28 RX ORDER — KETOROLAC TROMETHAMINE 10 MG/1
10 TABLET, FILM COATED ORAL EVERY 6 HOURS PRN
Qty: 12 TABLET | Refills: 0 | Status: SHIPPED | OUTPATIENT
Start: 2020-11-28 | End: 2021-02-11

## 2020-11-28 RX ORDER — KETOROLAC TROMETHAMINE 10 MG/1
10 TABLET, FILM COATED ORAL
Status: COMPLETED | OUTPATIENT
Start: 2020-11-28 | End: 2020-11-28

## 2020-11-28 RX ADMIN — KETOROLAC TROMETHAMINE 10 MG: 10 TABLET, FILM COATED ORAL at 04:11

## 2020-11-28 NOTE — ED PROVIDER NOTES
Encounter Date: 2020       History     Chief Complaint   Patient presents with    Foot Pain     The history is provided by the patient.   Foot Injury   The injury mechanism was a fall (on stairs). The incident occurred today. The pain is present in the right foot. The pain is at a severity of 10/10. The pain has been constant since onset. Pertinent negatives include no inability to bear weight, no loss of motion and no loss of sensation. She reports no foreign bodies present. The symptoms are aggravated by activity, bearing weight and palpation. She has tried nothing for the symptoms.     Review of patient's allergies indicates:  No Known Allergies  Past Medical History:   Diagnosis Date    Anxiety     GERD (gastroesophageal reflux disease)     Seizures      Past Surgical History:   Procedure Laterality Date     SECTION      x2    COLONOSCOPY N/A 2019    Procedure: COLONOSCOPY;  Surgeon: Geovani Willard MD;  Location: [facility];  Service: Endoscopy;  Laterality: N/A;    ESOPHAGOGASTRODUODENOSCOPY N/A 2019    Procedure: EGD (ESOPHAGOGASTRODUODENOSCOPY);  Surgeon: Geovani Willard MD;  Location: [facility];  Service: Endoscopy;  Laterality: N/A;    TUBAL LIGATION       Family History   Problem Relation Age of Onset    Hypertension Mother     Seizures Mother     Hypertension Father      Social History     Tobacco Use    Smoking status: Current Every Day Smoker     Packs/day: 0.50     Years: 26.00     Pack years: 13.00     Types: Cigarettes    Smokeless tobacco: Never Used   Substance Use Topics    Alcohol use: Yes     Comment: occ    Drug use: Yes     Types: Marijuana     Comment: occ     Review of Systems   Constitutional: Negative for fever.   HENT: Negative for congestion, ear pain, rhinorrhea and sore throat.    Respiratory: Negative for cough and shortness of breath.    Cardiovascular: Negative for chest pain.   Gastrointestinal: Negative for abdominal pain.    Genitourinary: Negative for difficulty urinating and dysuria.   Musculoskeletal: Positive for arthralgias (R foot). Negative for back pain, myalgias and neck pain.   Skin: Negative for rash and wound.   Neurological: Negative for weakness and headaches.   Psychiatric/Behavioral: Negative.        Physical Exam     Initial Vitals [11/28/20 1551]   BP Pulse Resp Temp SpO2   (!) 142/85 105 18 98.7 °F (37.1 °C) 99 %      MAP       --         Physical Exam    Nursing note and vitals reviewed.  Constitutional: No distress.   HENT:   Head: Normocephalic and atraumatic.   Nose: Nose normal.   Mouth/Throat: Oropharynx is clear and moist and mucous membranes are normal.   Eyes: Conjunctivae, EOM and lids are normal. Right pupil is round. Left pupil is round. Pupils are equal.   Neck: Neck supple.   Cardiovascular: Normal rate, regular rhythm and normal heart sounds.   Pulmonary/Chest: Effort normal and breath sounds normal. No respiratory distress.   Abdominal: Normal appearance.   Musculoskeletal: Normal range of motion.        Right foot: Tenderness present. No swelling, crepitus, deformity or laceration.   Neurological: She is alert and oriented to person, place, and time. She has normal strength. GCS eye subscore is 4. GCS verbal subscore is 5. GCS motor subscore is 6.   Skin: Skin is warm and dry. Capillary refill takes less than 2 seconds. No rash noted.   Psychiatric: She has a normal mood and affect. Her speech is normal and behavior is normal.         ED Course   Procedures  Labs Reviewed - No data to display       Imaging Results          X-Ray Foot Complete Right (Final result)  Result time 11/28/20 17:04:48    Final result by Yong Key MD (11/28/20 17:04:48)                 Impression:      No acute osseous abnormality.      Electronically signed by: Yong Key MD  Date:    11/28/2020  Time:    17:04             Narrative:    EXAMINATION:  XR FOOT COMPLETE 3 VIEW RIGHT    CLINICAL HISTORY:  .  Pain in unspecified foot    TECHNIQUE:  AP, lateral, and oblique views of the right foot were performed.    COMPARISON:  None    FINDINGS:  No fracture or dislocation.  The soft tissues are unremarkable.                                           Medications   ketorolac tablet 10 mg (10 mg Oral Given 11/28/20 1610)                        Clinical Impression:       ICD-10-CM ICD-9-CM   1. Foot pain  M79.673 729.5                      Disposition:   Disposition: Discharged  Condition: Stable    The patient acknowledges that close follow up with medical provider is required. Instructed to follow up with PCP within 2 days. Patient was given specific return precautions. The patient agrees to comply with all instruction and directions given in the ER.      ED Disposition Condition    Discharge Stable        ED Prescriptions     Medication Sig Dispense Start Date End Date Auth. Provider    ketorolac (TORADOL) 10 mg tablet Take 1 tablet (10 mg total) by mouth every 6 (six) hours as needed for Pain. 12 tablet 11/28/2020  Catherine Alexandra NP        Follow-up Information     Follow up With Specialties Details Why Contact Info    Philipp Parsons MD Family Medicine Schedule an appointment as soon as possible for a visit in 2 days  [address] [address]  [phone]                                         Catherine Alexandra NP  11/28/20 4324

## 2021-01-11 ENCOUNTER — HOSPITAL ENCOUNTER (EMERGENCY)
Facility: HOSPITAL | Age: 51
Discharge: HOME | End: 2021-01-11
Attending: SURGERY
Payer: MEDICAID

## 2021-01-11 VITALS
BODY MASS INDEX: 31.09 KG/M2 | SYSTOLIC BLOOD PRESSURE: 135 MMHG | TEMPERATURE: 98 F | WEIGHT: 186.81 LBS | DIASTOLIC BLOOD PRESSURE: 76 MMHG | RESPIRATION RATE: 16 BRPM | HEART RATE: 92 BPM | OXYGEN SATURATION: 98 %

## 2021-01-11 DIAGNOSIS — Z20.822 CLOSE EXPOSURE TO COVID-19 VIRUS: Primary | ICD-10-CM

## 2021-01-11 PROCEDURE — U0003 INFECTIOUS AGENT DETECTION BY NUCLEIC ACID (DNA OR RNA); SEVERE ACUTE RESPIRATORY SYNDROME CORONAVIRUS 2 (SARS-COV-2) (CORONAVIRUS DISEASE [COVID-19]), AMPLIFIED PROBE TECHNIQUE, MAKING USE OF HIGH THROUGHPUT TECHNOLOGIES AS DESCRIBED BY CMS-2020-01-R: HCPCS

## 2021-01-11 PROCEDURE — 99284 EMERGENCY DEPT VISIT MOD MDM: CPT

## 2021-01-11 RX ORDER — AZITHROMYCIN 250 MG/1
TABLET, FILM COATED ORAL
Qty: 6 TABLET | Refills: 0 | Status: SHIPPED | OUTPATIENT
Start: 2021-01-11 | End: 2021-02-11

## 2021-01-11 RX ORDER — BENZONATATE 100 MG/1
200 CAPSULE ORAL 3 TIMES DAILY PRN
Qty: 20 CAPSULE | Refills: 0 | Status: SHIPPED | OUTPATIENT
Start: 2021-01-11 | End: 2021-01-21

## 2021-01-13 LAB — SARS-COV-2 RNA RESP QL NAA+PROBE: NOT DETECTED

## 2021-05-04 ENCOUNTER — PATIENT MESSAGE (OUTPATIENT)
Dept: RESEARCH | Facility: HOSPITAL | Age: 51
End: 2021-05-04

## 2021-05-10 ENCOUNTER — PATIENT MESSAGE (OUTPATIENT)
Dept: RESEARCH | Facility: HOSPITAL | Age: 51
End: 2021-05-10

## 2021-07-01 ENCOUNTER — PATIENT MESSAGE (OUTPATIENT)
Dept: ADMINISTRATIVE | Facility: OTHER | Age: 51
End: 2021-07-01

## 2022-08-22 NOTE — ED NOTES
Pt d/c'd in stable condition. Amb out of ER with steady gait   [employer] Pregnancy And Lactation Text: This medication is Pregnancy Category B and is considered safe. There is some data to suggest avoiding during the third trimester. It is unknown if this medication is excreted in breast milk.

## 2022-09-26 ENCOUNTER — HOSPITAL ENCOUNTER (EMERGENCY)
Facility: HOSPITAL | Age: 52
Discharge: HOME | End: 2022-09-26
Attending: STUDENT IN AN ORGANIZED HEALTH CARE EDUCATION/TRAINING PROGRAM
Payer: MEDICAID

## 2022-09-26 VITALS
DIASTOLIC BLOOD PRESSURE: 68 MMHG | TEMPERATURE: 97 F | WEIGHT: 149.81 LBS | OXYGEN SATURATION: 96 % | SYSTOLIC BLOOD PRESSURE: 123 MMHG | RESPIRATION RATE: 20 BRPM | BODY MASS INDEX: 24.93 KG/M2 | HEART RATE: 77 BPM

## 2022-09-26 DIAGNOSIS — H60.02 ABSCESS OF LEFT EAR CANAL: Primary | ICD-10-CM

## 2022-09-26 PROCEDURE — 25000003 PHARM REV CODE 250: Performed by: NURSE PRACTITIONER

## 2022-09-26 PROCEDURE — 87070 CULTURE OTHR SPECIMN AEROBIC: CPT | Performed by: NURSE PRACTITIONER

## 2022-09-26 PROCEDURE — 10060 I&D ABSCESS SIMPLE/SINGLE: CPT

## 2022-09-26 PROCEDURE — 99284 EMERGENCY DEPT VISIT MOD MDM: CPT | Mod: 25

## 2022-09-26 PROCEDURE — 69000 DRG XTRNL EAR ABSC/HEM SMPL: CPT | Mod: LT

## 2022-09-26 RX ORDER — SULFAMETHOXAZOLE AND TRIMETHOPRIM 800; 160 MG/1; MG/1
1 TABLET ORAL 2 TIMES DAILY
Qty: 14 TABLET | Refills: 0 | Status: SHIPPED | OUTPATIENT
Start: 2022-09-26 | End: 2022-10-03

## 2022-09-26 RX ORDER — LIDOCAINE HYDROCHLORIDE 10 MG/ML
5 INJECTION, SOLUTION EPIDURAL; INFILTRATION; INTRACAUDAL; PERINEURAL
Status: COMPLETED | OUTPATIENT
Start: 2022-09-26 | End: 2022-09-26

## 2022-09-26 RX ORDER — MUPIROCIN 20 MG/G
1 OINTMENT TOPICAL
Status: COMPLETED | OUTPATIENT
Start: 2022-09-26 | End: 2022-09-26

## 2022-09-26 RX ORDER — MUPIROCIN 20 MG/G
OINTMENT TOPICAL 3 TIMES DAILY
Qty: 15 G | Refills: 0 | Status: SHIPPED | OUTPATIENT
Start: 2022-09-26 | End: 2024-04-02

## 2022-09-26 RX ORDER — IBUPROFEN 800 MG/1
800 TABLET ORAL EVERY 8 HOURS PRN
Qty: 20 TABLET | Refills: 0 | Status: SHIPPED | OUTPATIENT
Start: 2022-09-26 | End: 2023-11-20 | Stop reason: SDUPTHER

## 2022-09-26 RX ADMIN — MUPIROCIN 22 G: 20 OINTMENT TOPICAL at 02:09

## 2022-09-26 RX ADMIN — LIDOCAINE HYDROCHLORIDE 50 MG: 10 INJECTION, SOLUTION EPIDURAL; INFILTRATION; INTRACAUDAL at 02:09

## 2022-09-26 NOTE — ED PROVIDER NOTES
Encounter Date: 2022       History     Chief Complaint   Patient presents with    Otalgia     Ami Kamara is a 52 y.o. female with PMH of anxiety, GERD, seizure disorder who presents to the ED for evaluation of possible abscess.  Patient presents with abscess behind left ear.  She reports that initially started out as a small bump 3 days ago and gradually increased in size.  She presents with pain and swelling.  Pain is described as throbbing, currently rated 8/10 in severity.  She denies alleviating or exacerbating factors.  She denies drainage from wound.  Denies fever.    The history is provided by the patient.   Review of patient's allergies indicates:  No Known Allergies  Past Medical History:   Diagnosis Date    Anxiety     GERD (gastroesophageal reflux disease)     Seizures      Past Surgical History:   Procedure Laterality Date     SECTION      x2    COLONOSCOPY N/A 2019    Procedure: COLONOSCOPY;  Surgeon: Geovani Willard MD;  Location: [facility];  Service: Endoscopy;  Laterality: N/A;    ESOPHAGOGASTRODUODENOSCOPY N/A 2019    Procedure: EGD (ESOPHAGOGASTRODUODENOSCOPY);  Surgeon: Geovani Willard MD;  Location: [facility];  Service: Endoscopy;  Laterality: N/A;    TUBAL LIGATION       Family History   Problem Relation Age of Onset    Hypertension Mother     Seizures Mother     Hypertension Father      Social History     Tobacco Use    Smoking status: Every Day     Packs/day: 0.50     Years: 26.00     Pack years: 13.00     Types: Cigarettes    Smokeless tobacco: Never   Substance Use Topics    Alcohol use: Yes     Comment: occ    Drug use: Yes     Types: Marijuana     Comment: occ     Review of Systems   Constitutional:  Negative for activity change, chills and fever.   HENT:  Negative for congestion, ear discharge, ear pain, postnasal drip, sinus pressure, sinus pain and sore throat.    Respiratory:  Negative for cough, chest tightness and shortness of breath.     Cardiovascular:  Negative for chest pain.   Gastrointestinal:  Negative for abdominal distention, abdominal pain and nausea.   Genitourinary:  Negative for dysuria, frequency and urgency.   Musculoskeletal:  Negative for back pain.   Skin:  Positive for wound (Abscess left ear). Negative for rash.   Neurological:  Negative for dizziness, weakness, light-headedness and numbness.   Hematological:  Does not bruise/bleed easily.     Physical Exam     Initial Vitals [09/26/22 1251]   BP Pulse Resp Temp SpO2   123/68 77 20 96.7 °F (35.9 °C) 96 %      MAP       --         Physical Exam    Nursing note and vitals reviewed.  Constitutional: She appears well-developed and well-nourished.   HENT:   Head: Normocephalic and atraumatic.   Right Ear: Tympanic membrane, external ear and ear canal normal. No swelling. Tympanic membrane is not erythematous. No middle ear effusion.   Left Ear: Tympanic membrane, external ear and ear canal normal. Tympanic membrane is not erythematous.  No middle ear effusion.   Nose: Nose normal.   Mouth/Throat: Uvula is midline, oropharynx is clear and moist and mucous membranes are normal. Mucous membranes are not pale and not dry.   Eyes: Conjunctivae and EOM are normal. Pupils are equal, round, and reactive to light.   Neck: Neck supple.   Normal range of motion.  Cardiovascular:  Normal rate, regular rhythm, normal heart sounds and intact distal pulses.           Pulmonary/Chest: Effort normal and breath sounds normal. She has no decreased breath sounds. She has no wheezes. She has no rhonchi. She has no rales.   Abdominal: Abdomen is soft. Bowel sounds are normal. There is no abdominal tenderness.   Musculoskeletal:         General: Normal range of motion.      Cervical back: Normal range of motion and neck supple.     Neurological: She is alert and oriented to person, place, and time. She has normal strength. She displays normal reflexes. No cranial nerve deficit or sensory deficit.   Skin:  Skin is warm and dry. Capillary refill takes less than 2 seconds. Abscess (Left ear (0.5x0.5cm)) noted. No rash noted.   Psychiatric: She has a normal mood and affect. Her behavior is normal. Judgment and thought content normal.       ED Course   I & D - Incision and Drainage    Date/Time: 2022 6:35 PM  Location procedure was performed: [facility] EMERGENCY DEPARTMENT  Performed by: Sahara Thomas NP  Authorized by: Glynn Danielson MD   Assisting provider: Glynn Danielson MD  Pre-operative diagnosis: abscess L ear  Post-operative diagnosis: same  Consent Done: Yes  Consent: Verbal consent obtained.  Risks and benefits: risks, benefits and alternatives were discussed  Consent given by: patient  Patient understanding: patient states understanding of the procedure being performed  Patient identity confirmed: , name and verbally with patient  Type: abscess  Body area: head/neck  Location details: left external ear  Anesthesia: local infiltration    Anesthesia:  Local Anesthetic: lidocaine 1% without epinephrine  Anesthetic total: 5 mL  Scalpel size: 11  Incision type: single straight  Complexity: simple  Drainage: pus and purulent  Drainage amount: moderate  Wound treatment: incision, wound left open and deloculation  Specimens: Yes (wound culture collected)  Patient tolerance: Patient tolerated the procedure well with no immediate complications      Labs Reviewed   CULTURE, AEROBIC  (SPECIFY SOURCE)          Imaging Results    None          Medications   LIDOcaine (PF) 10 mg/ml (1%) injection 50 mg (50 mg Infiltration Given by Provider 22 1457)   mupirocin 2 % ointment 22 g (22 g Topical (Top) Given by Provider 22 1450)     Medical Decision Making:   Differential Diagnosis:   Abscess, folliculitis, mastitis   ED Management:  Evaluation of a 52-year-old female with left your abscess.  Patient presents with a 0.5 x 0.5 cm abscess with fluctuance and induration.  No drainage currently.  Incision and  drainage performed with moderate amount of purulent discharge.  Will be discharged home with prescription Bactrim and close follow-up with PCP in the next 2 days for wound check. Patient/caregiver voices understanding and feels comfortable with discharge plan.      The patient acknowledges that close follow up with medical provider is required. Instructed to follow up with PCP within 2 days. Patient was given specific return precautions. The patient agrees to comply with all instruction and directions given in the ER.                          Clinical Impression:   Final diagnoses:  [H60.02] Abscess of left ear canal (Primary)        ED Disposition Condition    Discharge Stable          ED Prescriptions       Medication Sig Dispense Start Date End Date Auth. Provider    sulfamethoxazole-trimethoprim 800-160mg (BACTRIM DS) 800-160 mg Tab Take 1 tablet by mouth 2 (two) times daily. for 7 days 14 tablet 9/26/2022 10/3/2022 Sahara Thomas NP    mupirocin (BACTROBAN) 2 % ointment Apply topically 3 (three) times daily. 15 g 9/26/2022 -- Sahara Thomas NP    ibuprofen (ADVIL,MOTRIN) 800 MG tablet Take 1 tablet (800 mg total) by mouth every 8 (eight) hours as needed for Pain. 20 tablet 9/26/2022 -- Sahara Thomas NP          Follow-up Information       Follow up With Specialties Details Why Contact Info    Caroline Colon NP Family Medicine Schedule an appointment as soon as possible for a visit in 2 days For wound re-check [address] [facility] [address]  [phone]               Sahara Thomas NP  09/26/22 9180

## 2022-09-29 LAB — BACTERIA SPEC AEROBE CULT: NORMAL

## 2023-01-09 ENCOUNTER — PATIENT MESSAGE (OUTPATIENT)
Dept: ADMINISTRATIVE | Facility: HOSPITAL | Age: 53
End: 2023-01-09
Payer: MEDICAID

## 2023-01-30 ENCOUNTER — PATIENT OUTREACH (OUTPATIENT)
Dept: ADMINISTRATIVE | Facility: HOSPITAL | Age: 53
End: 2023-01-30
Payer: MEDICAID

## 2023-04-03 ENCOUNTER — PATIENT MESSAGE (OUTPATIENT)
Dept: ADMINISTRATIVE | Facility: HOSPITAL | Age: 53
End: 2023-04-03
Payer: MEDICAID

## 2023-07-10 ENCOUNTER — PATIENT MESSAGE (OUTPATIENT)
Dept: ADMINISTRATIVE | Facility: HOSPITAL | Age: 53
End: 2023-07-10
Payer: MEDICAID

## 2023-11-01 ENCOUNTER — CLINICAL SUPPORT (OUTPATIENT)
Dept: SMOKING CESSATION | Facility: CLINIC | Age: 53
End: 2023-11-01
Payer: COMMERCIAL

## 2023-11-01 DIAGNOSIS — F17.200 NICOTINE DEPENDENCE: Primary | ICD-10-CM

## 2023-11-01 PROCEDURE — 99404 PREV MED CNSL INDIV APPRX 60: CPT | Mod: S$GLB,,,

## 2023-11-01 PROCEDURE — 99999 PR PBB SHADOW E&M-EST. PATIENT-LVL I: CPT | Mod: PBBFAC,,,

## 2023-11-01 PROCEDURE — 99999 PR PBB SHADOW E&M-EST. PATIENT-LVL I: ICD-10-PCS | Mod: PBBFAC,,,

## 2023-11-01 PROCEDURE — 99404 PR PREVENT COUNSEL,INDIV,60 MIN: ICD-10-PCS | Mod: S$GLB,,,

## 2023-11-01 RX ORDER — IBUPROFEN 200 MG
1 TABLET ORAL DAILY
Qty: 14 PATCH | Refills: 0 | Status: SHIPPED | OUTPATIENT
Start: 2023-11-01 | End: 2023-11-22 | Stop reason: SDUPTHER

## 2023-11-22 ENCOUNTER — CLINICAL SUPPORT (OUTPATIENT)
Dept: SMOKING CESSATION | Facility: CLINIC | Age: 53
End: 2023-11-22

## 2023-11-22 DIAGNOSIS — F17.200 NICOTINE DEPENDENCE: ICD-10-CM

## 2023-11-22 PROCEDURE — 99404 PR PREVENT COUNSEL,INDIV,60 MIN: ICD-10-PCS | Mod: S$GLB,,,

## 2023-11-22 PROCEDURE — 99999 PR PBB SHADOW E&M-EST. PATIENT-LVL I: CPT | Mod: PBBFAC,,,

## 2023-11-22 PROCEDURE — 99404 PREV MED CNSL INDIV APPRX 60: CPT | Mod: S$GLB,,,

## 2023-11-22 PROCEDURE — 99999 PR PBB SHADOW E&M-EST. PATIENT-LVL I: ICD-10-PCS | Mod: PBBFAC,,,

## 2023-11-22 RX ORDER — IBUPROFEN 200 MG
1 TABLET ORAL DAILY
Qty: 14 PATCH | Refills: 0 | Status: SHIPPED | OUTPATIENT
Start: 2023-11-22 | End: 2023-12-05 | Stop reason: SDUPTHER

## 2023-11-22 NOTE — PROGRESS NOTES
Individual Follow-Up Form    11/22/2023    Quit Date: TBD    Clinical Status of Patient: Outpatient    Length of Service: 60 minutes    Continuing Medication: yes  Patches    Other Medications: none     Target Symptoms: Withdrawal and medication side effects. The following were  rated moderate (3) to severe (4) on TCRS:  Moderate (3): desire/crave tobacco  Severe (4): none    Comments: Patient seen in clinic regarding smoking cessation progress update. Patient reports smoking 4-5 cpd (decreased from 15 cpd.) New goal of <4 cpd set. Pt started on NRT 14 mg nicotine patch QD. No adverse reactions noted at this time. Pt started on rate reduction and wait times prior to smoking. Goal quit date is patient's [birth year]. [birth year]. Identified patient personal reason for wanting to quit as health and the cost of smoking. Reviewed learned addiction model, triggers, cues, and short/long term consequences of tobacco use. Pt identifies personal triggers as withdrawal upon waking, following meals, stress, and anxiety. Pt's exhaled carbon monoxide level was 5 ppm as per Smokerlyzer. (non- smoker = 0-5 ppm.)  Pt to continue with counseling in 2 weeks.    Diagnosis: F17.200    Next Visit: 2 weeks

## 2023-12-05 ENCOUNTER — CLINICAL SUPPORT (OUTPATIENT)
Dept: SMOKING CESSATION | Facility: CLINIC | Age: 53
End: 2023-12-05

## 2023-12-05 DIAGNOSIS — F17.200 NICOTINE DEPENDENCE: ICD-10-CM

## 2023-12-05 PROCEDURE — 99403 PREV MED CNSL INDIV APPRX 45: CPT | Mod: S$GLB,,,

## 2023-12-05 PROCEDURE — 99403 PR PREVENT COUNSEL,INDIV,45 MIN: ICD-10-PCS | Mod: S$GLB,,,

## 2023-12-05 PROCEDURE — 99999 PR PBB SHADOW E&M-EST. PATIENT-LVL I: CPT | Mod: PBBFAC,,,

## 2023-12-05 PROCEDURE — 99999 PR PBB SHADOW E&M-EST. PATIENT-LVL I: ICD-10-PCS | Mod: PBBFAC,,,

## 2023-12-05 RX ORDER — IBUPROFEN 200 MG
1 TABLET ORAL DAILY
Qty: 14 PATCH | Refills: 0 | Status: SHIPPED | OUTPATIENT
Start: 2023-12-05 | End: 2023-12-12 | Stop reason: DRUGHIGH

## 2023-12-05 NOTE — PROGRESS NOTES
Individual Follow-Up Form    12/5/2023    Quit Date: TBD    Clinical Status of Patient: Outpatient    Length of Service: 45 minutes    Continuing Medication: yes  Patches    Other Medications: none     Target Symptoms: Withdrawal and medication side effects. The following were  rated moderate (3) to severe (4) on TCRS:  Moderate (3): desire/crave tobacco  Severe (4): none    Comments: Patient seen in clinic regarding smoking cessation progress update. Patient reports that she was able to meet goal on all days except for 3 and she is currently smoking 4 cpd.  New goal of <3 cpd set. Also encouraged pt to attempt a 24 hour dry run before our next meeting. Pt continues NRT 14 mg nicotine patch QD. No adverse reactions noted at this time. Pt doing well with rate reduction and wait times prior to smoking. Goal quit date remains 1/9. Completion of TCRS (Tobacco Cessation Rating Scale) reviewed strategies, cues, and triggers. Introduced the negative impact of tobacco on health, the health advantages of discontinuing the use of tobacco, time line improved health changes after a quit, withdrawal issues to expect from nicotine and habit, and ways to achieve the goal of a quit.   Pt's exhaled carbon monoxide level was 8 ppm as per Smokerlyzer. (non- smoker = 0-5 ppm.)  Pt to continue with counseling in 1 week.     Diagnosis: F17.200    Next Visit: 1 week

## 2023-12-12 ENCOUNTER — CLINICAL SUPPORT (OUTPATIENT)
Dept: SMOKING CESSATION | Facility: CLINIC | Age: 53
End: 2023-12-12

## 2023-12-12 DIAGNOSIS — F17.200 NICOTINE DEPENDENCE: Primary | ICD-10-CM

## 2023-12-12 PROCEDURE — 99999 PR PBB SHADOW E&M-EST. PATIENT-LVL I: CPT | Mod: PBBFAC,,,

## 2023-12-12 PROCEDURE — 99402 PREV MED CNSL INDIV APPRX 30: CPT | Mod: S$GLB,,,

## 2023-12-12 PROCEDURE — 99402 PR PREVENT COUNSEL,INDIV,30 MIN: ICD-10-PCS | Mod: S$GLB,,,

## 2023-12-12 PROCEDURE — 99999 PR PBB SHADOW E&M-EST. PATIENT-LVL I: ICD-10-PCS | Mod: PBBFAC,,,

## 2023-12-12 RX ORDER — NICOTINE 7MG/24HR
1 PATCH, TRANSDERMAL 24 HOURS TRANSDERMAL DAILY
Qty: 14 PATCH | Refills: 0 | Status: SHIPPED | OUTPATIENT
Start: 2023-12-12 | End: 2024-04-02

## 2023-12-12 NOTE — PROGRESS NOTES
Individual Follow-Up Form    12/12/2023    Quit Date: TBD    Clinical Status of Patient: Outpatient    Length of Service: 30 minutes    Continuing Medication: yes  Patches    Other Medications: none     Target Symptoms: Withdrawal and medication side effects. The following were  rated moderate (3) to severe (4) on TCRS:  Moderate (3): desire/crave tobacco  Severe (4): none    Comments: Patient seen in clinic regarding smoking cessation progress update. Patient reports that she is currently smoking 3 cpd. Commended pt for accomplishments thus far. New goal is to attempt a dry run. Pt continues NRT 14 mg nicotine patch QD. No adverse reactions noted at this time. Dose adjustment made to 7 mg patch QD. Goal quit date remains 1/9. Completion of TCRS (Tobacco Cessation Rating Scale) reviewed strategies, controlling environment, cues, triggers, new goals set. Introduced high risk situations with preparation interventions, caffeine similarities with withdrawal issues of habit and nicotine, Alcohol, Understanding urges, cravings, stress and relaxation. Open discussion with intervention discussion.   Pt to continue with counseling in 3 week.      Diagnosis: F17.200    Next Visit: 3 weeks due to holidays

## 2024-01-02 ENCOUNTER — CLINICAL SUPPORT (OUTPATIENT)
Dept: SMOKING CESSATION | Facility: CLINIC | Age: 54
End: 2024-01-02

## 2024-01-02 DIAGNOSIS — F17.200 NICOTINE DEPENDENCE: Primary | ICD-10-CM

## 2024-01-02 PROCEDURE — 99999 PR PBB SHADOW E&M-EST. PATIENT-LVL I: CPT | Mod: PBBFAC,,,

## 2024-01-02 NOTE — PROGRESS NOTES
Individual Follow-Up Form    1/2/2024    Quit Date: TBD    Clinical Status of Patient: Outpatient    Length of Service: 45 minutes    Continuing Medication: no    Other Medications: none     Target Symptoms: Withdrawal and medication side effects. The following were  rated moderate (3) to severe (4) on TCRS:  Moderate (3): desire/crave tobacco  Severe (4): none    Comments: Spoke with patient regarding smoking cessation follow up. Pt states that she is currently smoking 2-5 cpd. Discussed preparations for upcoming quit attempt on 1/9. Pt has opted to stop using NRT at this time. Completion of TCRS (Tobacco Cessation Rating Scale) reviewed strategies, controlling environment, cues, triggers, new goals set. Introduced high risk situations with preparation interventions, caffeine similarities with withdrawal issues of habit and nicotine, Alcohol, Understanding urges, cravings, stress and relaxation. Open discussion with intervention discussion. Commended pt for progress made thus far. Dicussed positive mindset. FU in 2 weeks.    Diagnosis: F17.200    Next Visit: 2 weeks

## 2024-01-16 ENCOUNTER — TELEPHONE (OUTPATIENT)
Dept: SMOKING CESSATION | Facility: CLINIC | Age: 54
End: 2024-01-16
Payer: MEDICAID

## 2024-01-31 ENCOUNTER — CLINICAL SUPPORT (OUTPATIENT)
Dept: SMOKING CESSATION | Facility: CLINIC | Age: 54
End: 2024-01-31

## 2024-01-31 DIAGNOSIS — F17.200 NICOTINE DEPENDENCE: Primary | ICD-10-CM

## 2024-01-31 NOTE — PROGRESS NOTES
Spoke with patient today in regard to smoking cessation progress for 3 month telephone follow up, she states not tobacco free. Patient states not being able to cut down on smoking and no interest in return ing to the program at this time. Informed patient of benefit period, future follow ups, and contact information if any further help or support is needed. Will complete smart form for 3 month follow up on Quit attempt #1.

## 2024-03-24 ENCOUNTER — HOSPITAL ENCOUNTER (EMERGENCY)
Facility: HOSPITAL | Age: 54
Discharge: HOME | End: 2024-03-24
Attending: SURGERY
Payer: MEDICAID

## 2024-03-24 VITALS
WEIGHT: 169 LBS | HEART RATE: 90 BPM | OXYGEN SATURATION: 99 % | RESPIRATION RATE: 19 BRPM | BODY MASS INDEX: 28.16 KG/M2 | HEIGHT: 65 IN | TEMPERATURE: 98 F | SYSTOLIC BLOOD PRESSURE: 149 MMHG | DIASTOLIC BLOOD PRESSURE: 79 MMHG

## 2024-03-24 DIAGNOSIS — K04.01 ACUTE PULPITIS: Primary | ICD-10-CM

## 2024-03-24 PROCEDURE — 63600175 PHARM REV CODE 636 W HCPCS: Performed by: SURGERY

## 2024-03-24 PROCEDURE — 96372 THER/PROPH/DIAG INJ SC/IM: CPT | Performed by: SURGERY

## 2024-03-24 PROCEDURE — 99284 EMERGENCY DEPT VISIT MOD MDM: CPT | Mod: 25

## 2024-03-24 RX ORDER — KETOROLAC TROMETHAMINE 30 MG/ML
60 INJECTION, SOLUTION INTRAMUSCULAR; INTRAVENOUS
Status: COMPLETED | OUTPATIENT
Start: 2024-03-24 | End: 2024-03-24

## 2024-03-24 RX ORDER — AMOXICILLIN 875 MG/1
875 TABLET, FILM COATED ORAL 2 TIMES DAILY
Qty: 14 TABLET | Refills: 0 | Status: SHIPPED | OUTPATIENT
Start: 2024-03-24 | End: 2024-03-31

## 2024-03-24 RX ORDER — IBUPROFEN 800 MG/1
800 TABLET ORAL EVERY 6 HOURS PRN
Qty: 20 TABLET | Refills: 0 | Status: SHIPPED | OUTPATIENT
Start: 2024-03-24

## 2024-03-24 RX ADMIN — KETOROLAC TROMETHAMINE 60 MG: 30 INJECTION, SOLUTION INTRAMUSCULAR at 04:03

## 2024-03-24 NOTE — ED PROVIDER NOTES
Encounter Date: 3/24/2024       History     Chief Complaint   Patient presents with    Dental Pain     Patient to ER CC of dental pain on the left side for a few days      History of Present Illness  Ami Kamara is a 54 y.o. female that presents with dental pain today  Patient with several cavities with poor dentition with several fractured teeth now  Patient with no facial swelling or signs of dental abscess on ER evaluation today  Patient states she has not follow-up with a dentist for her chronic year long pain    The history is provided by the patient.     Review of patient's allergies indicates:  No Known Allergies  Past Medical History:   Diagnosis Date    Anxiety     GERD (gastroesophageal reflux disease)     Seizures      Past Surgical History:   Procedure Laterality Date     SECTION      x2    COLONOSCOPY N/A 2019    Procedure: COLONOSCOPY;  Surgeon: Geovani Willard MD;  Location: [facility];  Service: Endoscopy;  Laterality: N/A;    ESOPHAGOGASTRODUODENOSCOPY N/A 2019    Procedure: EGD (ESOPHAGOGASTRODUODENOSCOPY);  Surgeon: Geovani Willard MD;  Location: [facility];  Service: Endoscopy;  Laterality: N/A;    TUBAL LIGATION       Family History   Problem Relation Age of Onset    Hypertension Mother     Seizures Mother     Hypertension Father      Social History     Tobacco Use    Smoking status: Every Day     Current packs/day: 0.25     Average packs/day: 1 pack/day for 33.2 years (33.0 ttl pk-yrs)     Types: Cigarettes     Start date:     Smokeless tobacco: Never    Tobacco comments:     Enroll  in  [place]   Substance Use Topics    Alcohol use: Yes     Comment: occ    Drug use: Yes     Types: Marijuana     Comment: occ     Review of Systems   Constitutional: Negative.    HENT:  Positive for dental problem.    Eyes: Negative.    Respiratory: Negative.     Cardiovascular: Negative.    Gastrointestinal: Negative.    Genitourinary: Negative.    Musculoskeletal:  Negative.    Skin: Negative.    Neurological: Negative.    Psychiatric/Behavioral: Negative.         Physical Exam     Initial Vitals [03/24/24 1633]   BP Pulse Resp Temp SpO2   (!) 149/79 90 19 97.8 °F (36.6 °C) 99 %      MAP       --         Physical Exam    Nursing note and vitals reviewed.  Constitutional: Vital signs are normal. She appears well-developed and well-nourished. She is cooperative.   HENT:   Head: Normocephalic and atraumatic.   (+) bilateral cavities in the upper & lower molar areas with no facial swelling   Eyes: Conjunctivae, EOM and lids are normal. Pupils are equal, round, and reactive to light.   Neck: Trachea normal and phonation normal. Neck supple. No JVD present.   Normal range of motion.   Full passive range of motion without pain.     Cardiovascular:  Normal rate, regular rhythm, S1 normal, S2 normal, normal heart sounds, intact distal pulses and normal pulses.           Pulmonary/Chest: Effort normal and breath sounds normal.   Abdominal: Abdomen is soft and flat. Bowel sounds are normal.   Musculoskeletal:         General: Normal range of motion.      Cervical back: Full passive range of motion without pain, normal range of motion and neck supple.     Neurological: She is alert and oriented to person, place, and time. She has normal strength.   Skin: Skin is warm, dry and intact. Capillary refill takes less than 2 seconds.         ED Course   Procedures  Labs Reviewed - No data to display       Imaging Results    None          Medications   ketorolac injection 60 mg (60 mg Intramuscular Given 3/24/24 1641)     Medical Decision Making  54-year-old female presents with dental pain in the emergency room today  Differential includes gingivitis, pulpitis, dental cavities, dental abscess    Problems Addressed:  Acute pulpitis: complicated acute illness or injury    ED Management & Risks of Complication, Morbidity, & Mortality:  IM Toradol administered in the emergency room, antibiotic  Rx  See a dentist 1st thing tomorrow morning for further evaluation  Carefully counseled to follow-up within next 48 hours after ER discharge  Follow-up with PCP &/or specialist until complete resolution of symptoms  Pt/Family counseled to return to the ER with any concerning symptoms     Need for Hospitalization or Surgery with Social Determinants of Health:  This patient does not need to be hospitalized on ER evaluation today  The patient's diagnosis is not limited by social determinants of health  Does not require surgery or procedure (major or minor), no risk factors    Clinical Impression:  Final diagnoses:  [K04.01] Acute pulpitis (Primary)          ED Disposition Condition    Discharge Stable          ED Prescriptions       Medication Sig Dispense Start Date End Date Auth. Provider    amoxicillin (AMOXIL) 875 MG tablet Take 1 tablet (875 mg total) by mouth 2 (two) times daily. for 7 days 14 tablet 3/24/2024 3/31/2024 Florin Choudhary MD    ibuprofen (ADVIL,MOTRIN) 800 MG tablet Take 1 tablet (800 mg total) by mouth every 6 (six) hours as needed for Pain. 20 tablet 3/24/2024 -- Florin Choudhary MD          Follow-up Information       Follow up With Specialties Details Why Contact Info    [place] Schedule an appointment as soon as possible for a visit in 2 days  [address] [address]  [phone]               Florin Choudhary MD  03/24/24 6576

## 2024-03-24 NOTE — Clinical Note
"Ami Jaureguiabram Kamara was seen and treated in our emergency department on 3/24/2024.  She may return to work on 03/26/2024.       If you have any questions or concerns, please don't hesitate to call.      MADAY Cowan NRP     "

## 2024-04-02 ENCOUNTER — OFFICE VISIT (OUTPATIENT)
Dept: FAMILY MEDICINE | Facility: CLINIC | Age: 54
End: 2024-04-02
Payer: MEDICAID

## 2024-04-02 ENCOUNTER — CLINICAL SUPPORT (OUTPATIENT)
Dept: FAMILY MEDICINE | Facility: CLINIC | Age: 54
End: 2024-04-02
Payer: MEDICAID

## 2024-04-02 VITALS
DIASTOLIC BLOOD PRESSURE: 62 MMHG | HEIGHT: 65 IN | RESPIRATION RATE: 20 BRPM | HEART RATE: 84 BPM | OXYGEN SATURATION: 93 % | WEIGHT: 165 LBS | SYSTOLIC BLOOD PRESSURE: 106 MMHG | BODY MASS INDEX: 27.49 KG/M2

## 2024-04-02 DIAGNOSIS — Z13.1 DIABETES MELLITUS SCREENING: ICD-10-CM

## 2024-04-02 DIAGNOSIS — Z13.6 ENCOUNTER FOR LIPID SCREENING FOR CARDIOVASCULAR DISEASE: ICD-10-CM

## 2024-04-02 DIAGNOSIS — J01.00 ACUTE NON-RECURRENT MAXILLARY SINUSITIS: ICD-10-CM

## 2024-04-02 DIAGNOSIS — R05.9 COUGH, UNSPECIFIED TYPE: ICD-10-CM

## 2024-04-02 DIAGNOSIS — F41.9 ANXIETY: ICD-10-CM

## 2024-04-02 DIAGNOSIS — Z87.898 HISTORY OF SEIZURES: ICD-10-CM

## 2024-04-02 DIAGNOSIS — I10 ESSENTIAL HYPERTENSION: ICD-10-CM

## 2024-04-02 DIAGNOSIS — K08.9 CHRONIC DENTAL PAIN: ICD-10-CM

## 2024-04-02 DIAGNOSIS — G89.29 CHRONIC DENTAL PAIN: ICD-10-CM

## 2024-04-02 DIAGNOSIS — Z76.89 ENCOUNTER TO ESTABLISH CARE: Primary | ICD-10-CM

## 2024-04-02 DIAGNOSIS — Z13.220 ENCOUNTER FOR LIPID SCREENING FOR CARDIOVASCULAR DISEASE: ICD-10-CM

## 2024-04-02 DIAGNOSIS — R09.81 NASAL CONGESTION: ICD-10-CM

## 2024-04-02 DIAGNOSIS — R09.81 NASAL CONGESTION: Primary | ICD-10-CM

## 2024-04-02 LAB
ALBUMIN SERPL BCP-MCNC: 4.3 G/DL (ref 3.5–5.2)
ALP SERPL-CCNC: 91 U/L (ref 55–135)
ALT SERPL W/O P-5'-P-CCNC: 18 U/L (ref 10–44)
ANION GAP SERPL CALC-SCNC: 9 MMOL/L (ref 8–16)
AST SERPL-CCNC: 15 U/L (ref 10–40)
BASOPHILS # BLD AUTO: 0.02 K/UL (ref 0–0.2)
BASOPHILS NFR BLD: 0.4 % (ref 0–1.9)
BILIRUB SERPL-MCNC: 0.3 MG/DL (ref 0.1–1)
BUN SERPL-MCNC: 12 MG/DL (ref 6–20)
CALCIUM SERPL-MCNC: 10.3 MG/DL (ref 8.7–10.5)
CHLORIDE SERPL-SCNC: 103 MMOL/L (ref 95–110)
CHOLEST SERPL-MCNC: 190 MG/DL (ref 120–199)
CHOLEST/HDLC SERPL: 4.3 {RATIO} (ref 2–5)
CO2 SERPL-SCNC: 30 MMOL/L (ref 23–29)
CREAT SERPL-MCNC: 0.9 MG/DL (ref 0.5–1.4)
CTP QC/QA: YES
CTP QC/QA: YES
DIFFERENTIAL METHOD BLD: ABNORMAL
EOSINOPHIL # BLD AUTO: 0.2 K/UL (ref 0–0.5)
EOSINOPHIL NFR BLD: 4.1 % (ref 0–8)
ERYTHROCYTE [DISTWIDTH] IN BLOOD BY AUTOMATED COUNT: 12.8 % (ref 11.5–14.5)
EST. GFR  (NO RACE VARIABLE): >60 ML/MIN/1.73 M^2
ESTIMATED AVG GLUCOSE: 111 MG/DL (ref 68–131)
GLUCOSE SERPL-MCNC: 103 MG/DL (ref 70–110)
HBA1C MFR BLD: 5.5 % (ref 4–5.6)
HCT VFR BLD AUTO: 38.1 % (ref 37–48.5)
HDLC SERPL-MCNC: 44 MG/DL (ref 40–75)
HDLC SERPL: 23.2 % (ref 20–50)
HGB BLD-MCNC: 12.4 G/DL (ref 12–16)
IMM GRANULOCYTES # BLD AUTO: 0.01 K/UL (ref 0–0.04)
IMM GRANULOCYTES NFR BLD AUTO: 0.2 % (ref 0–0.5)
LDLC SERPL CALC-MCNC: 134.8 MG/DL (ref 63–159)
LYMPHOCYTES # BLD AUTO: 2.6 K/UL (ref 1–4.8)
LYMPHOCYTES NFR BLD: 49.2 % (ref 18–48)
MCH RBC QN AUTO: 26.6 PG (ref 27–31)
MCHC RBC AUTO-ENTMCNC: 32.5 G/DL (ref 32–36)
MCV RBC AUTO: 82 FL (ref 82–98)
MONOCYTES # BLD AUTO: 0.5 K/UL (ref 0.3–1)
MONOCYTES NFR BLD: 9.2 % (ref 4–15)
NEUTROPHILS # BLD AUTO: 2 K/UL (ref 1.8–7.7)
NEUTROPHILS NFR BLD: 36.9 % (ref 38–73)
NONHDLC SERPL-MCNC: 146 MG/DL
NRBC BLD-RTO: 0 /100 WBC
PLATELET # BLD AUTO: 268 K/UL (ref 150–450)
PMV BLD AUTO: 11 FL (ref 9.2–12.9)
POC MOLECULAR INFLUENZA A AGN: NEGATIVE
POC MOLECULAR INFLUENZA B AGN: NEGATIVE
POTASSIUM SERPL-SCNC: 4.2 MMOL/L (ref 3.5–5.1)
PROT SERPL-MCNC: 8.2 G/DL (ref 6–8.4)
RBC # BLD AUTO: 4.67 M/UL (ref 4–5.4)
SARS-COV-2 RDRP RESP QL NAA+PROBE: NEGATIVE
SODIUM SERPL-SCNC: 142 MMOL/L (ref 136–145)
TRIGL SERPL-MCNC: 56 MG/DL (ref 30–150)
TSH SERPL DL<=0.005 MIU/L-ACNC: 1.98 UIU/ML (ref 0.4–4)
WBC # BLD AUTO: 5.33 K/UL (ref 3.9–12.7)

## 2024-04-02 PROCEDURE — 99999PBSHW: Mod: PBBFAC,,,

## 2024-04-02 PROCEDURE — 99214 OFFICE O/P EST MOD 30 MIN: CPT | Mod: PBBFAC | Performed by: STUDENT IN AN ORGANIZED HEALTH CARE EDUCATION/TRAINING PROGRAM

## 2024-04-02 PROCEDURE — 3008F BODY MASS INDEX DOCD: CPT | Mod: CPTII,,, | Performed by: STUDENT IN AN ORGANIZED HEALTH CARE EDUCATION/TRAINING PROGRAM

## 2024-04-02 PROCEDURE — 80053 COMPREHEN METABOLIC PANEL: CPT | Performed by: STUDENT IN AN ORGANIZED HEALTH CARE EDUCATION/TRAINING PROGRAM

## 2024-04-02 PROCEDURE — 3074F SYST BP LT 130 MM HG: CPT | Mod: CPTII,,, | Performed by: STUDENT IN AN ORGANIZED HEALTH CARE EDUCATION/TRAINING PROGRAM

## 2024-04-02 PROCEDURE — 85025 COMPLETE CBC W/AUTO DIFF WBC: CPT | Performed by: STUDENT IN AN ORGANIZED HEALTH CARE EDUCATION/TRAINING PROGRAM

## 2024-04-02 PROCEDURE — 1160F RVW MEDS BY RX/DR IN RCRD: CPT | Mod: CPTII,,, | Performed by: STUDENT IN AN ORGANIZED HEALTH CARE EDUCATION/TRAINING PROGRAM

## 2024-04-02 PROCEDURE — 99204 OFFICE O/P NEW MOD 45 MIN: CPT | Mod: S$PBB,,, | Performed by: STUDENT IN AN ORGANIZED HEALTH CARE EDUCATION/TRAINING PROGRAM

## 2024-04-02 PROCEDURE — 87502 INFLUENZA DNA AMP PROBE: CPT | Mod: PBBFAC | Performed by: STUDENT IN AN ORGANIZED HEALTH CARE EDUCATION/TRAINING PROGRAM

## 2024-04-02 PROCEDURE — 99999 PR PBB SHADOW E&M-EST. PATIENT-LVL IV: CPT | Mod: PBBFAC,,, | Performed by: STUDENT IN AN ORGANIZED HEALTH CARE EDUCATION/TRAINING PROGRAM

## 2024-04-02 PROCEDURE — 87635 SARS-COV-2 COVID-19 AMP PRB: CPT | Mod: PBBFAC | Performed by: STUDENT IN AN ORGANIZED HEALTH CARE EDUCATION/TRAINING PROGRAM

## 2024-04-02 PROCEDURE — 1159F MED LIST DOCD IN RCRD: CPT | Mod: CPTII,,, | Performed by: STUDENT IN AN ORGANIZED HEALTH CARE EDUCATION/TRAINING PROGRAM

## 2024-04-02 PROCEDURE — 3078F DIAST BP <80 MM HG: CPT | Mod: CPTII,,, | Performed by: STUDENT IN AN ORGANIZED HEALTH CARE EDUCATION/TRAINING PROGRAM

## 2024-04-02 PROCEDURE — 80061 LIPID PANEL: CPT | Performed by: STUDENT IN AN ORGANIZED HEALTH CARE EDUCATION/TRAINING PROGRAM

## 2024-04-02 PROCEDURE — 84443 ASSAY THYROID STIM HORMONE: CPT | Performed by: STUDENT IN AN ORGANIZED HEALTH CARE EDUCATION/TRAINING PROGRAM

## 2024-04-02 PROCEDURE — 83036 HEMOGLOBIN GLYCOSYLATED A1C: CPT | Performed by: STUDENT IN AN ORGANIZED HEALTH CARE EDUCATION/TRAINING PROGRAM

## 2024-04-02 PROCEDURE — 99999PBSHW POCT INFLUENZA A/B MOLECULAR: Mod: PBBFAC,,,

## 2024-04-02 RX ORDER — AMOXICILLIN AND CLAVULANATE POTASSIUM 875; 125 MG/1; MG/1
1 TABLET, FILM COATED ORAL EVERY 12 HOURS
Qty: 20 TABLET | Refills: 0 | Status: SHIPPED | OUTPATIENT
Start: 2024-04-02 | End: 2024-04-12

## 2024-04-02 NOTE — LETTER
April 2, 2024    Ami Kamara  [address]  [facility] [address]             [facility]  [address]  [facility] [address]  Phone: [phone]  Fax: [phone]   April 2, 2024     Patient: Ami Kamara   YOB: 1970   Date of Visit: 4/2/2024       To Whom it May Concern:    Ami Kamara was seen in my clinic on 4/2/2024. She may return to work on 04/04/2024 .    Please excuse her from any classes or work missed.    If you have any questions or concerns, please don't hesitate to call.    Sincerely,         Elissa Dee MA

## 2024-04-02 NOTE — PROGRESS NOTES
Subjective:       Patient ID: Ami Kamara is a 54 y.o. female.    Chief Complaint: Establish Care (Patient is here today to establish care. ), Nasal Congestion (X 1 week. ), and Cough (X 1 week )    Pt here to establish care.     She does report she has had some congestion and cough for the last week. She has sneezing as well. No fever/chills. No SOB. She has associated sinus headache and pressure.    She is a daily smoker. She has been trying to quit but has not been unsuccessful even with smoking cessation clinic.     She reports she lost her 32yo son to a heart attach last year.     She has seizure disorder and was on keppra but she is no longer taking. Her last seizure was years ago.     She has HTN and is on HCTZ 25mg daily.     She has anxiety and is taking fluoxetine 20mg daily.     She is requesting referral to [facility].     Review of Systems   Constitutional:  Negative for chills and fever.   HENT:  Positive for congestion, sinus pressure and sinus pain. Negative for sore throat.    Respiratory:  Positive for cough. Negative for shortness of breath.    Cardiovascular:  Negative for chest pain.   Gastrointestinal:  Negative for abdominal pain, diarrhea, nausea and vomiting.   Genitourinary:  Negative for dysuria and hematuria.   Neurological:  Negative for dizziness, syncope and light-headedness.       Objective:      Physical Exam  Vitals reviewed.   Constitutional:       General: She is not in acute distress.  HENT:      Head: Normocephalic and atraumatic.      Right Ear: Tympanic membrane, ear canal and external ear normal.      Left Ear: Tympanic membrane, ear canal and external ear normal.      Nose: Congestion present.      Mouth/Throat:      Mouth: Mucous membranes are moist.      Pharynx: Posterior oropharyngeal erythema present. No oropharyngeal exudate.   Eyes:      Conjunctiva/sclera: Conjunctivae normal.   Cardiovascular:      Rate and Rhythm: Normal rate and regular rhythm.       Heart sounds: Normal heart sounds. No murmur heard.  Pulmonary:      Effort: Pulmonary effort is normal. No respiratory distress.      Breath sounds: Normal breath sounds.   Neurological:      General: No focal deficit present.      Mental Status: She is alert and oriented to person, place, and time.   Psychiatric:         Mood and Affect: Mood normal.         Behavior: Behavior normal.         Assessment:       1. Encounter to establish care    2. Nasal congestion    3. Cough, unspecified type    4. History of seizures    5. Anxiety    6. Essential hypertension    7. Diabetes mellitus screening    8. Encounter for lipid screening for cardiovascular disease    9. Chronic dental pain    10. Acute non-recurrent maxillary sinusitis        Plan:           1. Encounter to establish care    2. Nasal congestion  -     POCT Influenza A/B Molecular  -     POCT COVID-19 Rapid Screening    3. Cough, unspecified type  -     POCT Influenza A/B Molecular  -     POCT COVID-19 Rapid Screening    4. History of seizures    5. Anxiety    6. Essential hypertension  -     CBC Auto Differential; Future; Expected date: 04/02/2024  -     Comprehensive Metabolic Panel; Future; Expected date: 04/02/2024  -     Lipid Panel; Future; Expected date: 04/02/2024  -     TSH; Future; Expected date: 04/02/2024    7. Diabetes mellitus screening  -     Hemoglobin A1C; Future; Expected date: 04/02/2024    8. Encounter for lipid screening for cardiovascular disease  -     Lipid Panel; Future; Expected date: 04/02/2024    9. Chronic dental pain  -     Ambulatory referral/consult to Dentistry ([place]); Future; Expected date: 04/09/2024    10. Acute non-recurrent maxillary sinusitis  -     amoxicillin-clavulanate 875-125mg (AUGMENTIN) 875-125 mg per tablet; Take 1 tablet by mouth every 12 (twelve) hours. for 10 days  Dispense: 20 tablet; Refill: 0    Flu: negative  Covid: negative    Supportive care  Stay well hydrated with water  Abx as ordered  Cont  current meds  Labs as ordered     RTC 6 months or sooner if needed

## 2024-05-02 ENCOUNTER — TELEPHONE (OUTPATIENT)
Dept: SMOKING CESSATION | Facility: CLINIC | Age: 54
End: 2024-05-02
Payer: MEDICAID

## 2024-09-17 ENCOUNTER — TELEPHONE (OUTPATIENT)
Dept: FAMILY MEDICINE | Facility: CLINIC | Age: 54
End: 2024-09-17
Payer: MEDICAID

## 2024-09-17 NOTE — TELEPHONE ENCOUNTER
----- Message from Hussein Blankenship sent at 2024  9:25 AM CDT -----  Contact: pt  Ami Kamara  MRN: [id number]  : 1970  PCP: Sam Hamilton  Home Phone      [phone]  Work Phone      Not on file.  Mobile          [phone]      MESSAGE:     Pt called to schedule appt for right wrist swollen. PAR didn't have anything until end of Dec. Pt would like to be seen sooner.      Please advise  [phone]

## 2024-09-20 ENCOUNTER — HOSPITAL ENCOUNTER (EMERGENCY)
Facility: HOSPITAL | Age: 54
Discharge: HOME | End: 2024-09-20
Attending: SURGERY
Payer: MEDICAID

## 2024-09-20 VITALS
RESPIRATION RATE: 18 BRPM | TEMPERATURE: 98 F | SYSTOLIC BLOOD PRESSURE: 138 MMHG | WEIGHT: 171.5 LBS | DIASTOLIC BLOOD PRESSURE: 72 MMHG | HEART RATE: 60 BPM | OXYGEN SATURATION: 99 % | HEIGHT: 65 IN | BODY MASS INDEX: 28.57 KG/M2

## 2024-09-20 DIAGNOSIS — M25.531 WRIST PAIN, ACUTE, RIGHT: Primary | ICD-10-CM

## 2024-09-20 PROCEDURE — 99283 EMERGENCY DEPT VISIT LOW MDM: CPT | Mod: 25

## 2024-09-20 PROCEDURE — 29125 APPL SHORT ARM SPLINT STATIC: CPT | Mod: RT

## 2024-09-20 RX ORDER — HYDROCODONE BITARTRATE AND ACETAMINOPHEN 5; 325 MG/1; MG/1
1 TABLET ORAL EVERY 8 HOURS PRN
Qty: 12 TABLET | Refills: 0 | Status: SHIPPED | OUTPATIENT
Start: 2024-09-20 | End: 2024-10-04

## 2024-09-20 NOTE — Clinical Note
"Ami"Saadia Kamara was seen and treated in our emergency department on 9/20/2024.  She may return to work on 09/21/2024.       If you have any questions or concerns, please don't hesitate to call.      Florin Low, NP"

## 2024-09-20 NOTE — ED PROVIDER NOTES
Encounter Date: 2024       History     Chief Complaint   Patient presents with    Wrist Pain     This note is dictated on M*Modal word recognition program.  There are word recognition mistakes and grammatical errors that are occasionally missed on review.     Ami Kamara is a 54 y.o. female presents to ER today with complaints of right wrist pain for the past few weeks.  Patient denies injury or trauma.  Patient reports her wrist was swollen the past few days however more recently the swelling has subsided to right wrist.  Patient reports she has been trying 800 Motrin with minimal relief of symptoms.  Patient reports she has a follow-up with the PCP on .  Patient rates wrist pain 5/10 currently.      The history is provided by the patient.     Review of patient's allergies indicates:  No Known Allergies  Past Medical History:   Diagnosis Date    Anxiety     GERD (gastroesophageal reflux disease)     Seizures      Past Surgical History:   Procedure Laterality Date     SECTION      x2    COLONOSCOPY N/A 2019    Procedure: COLONOSCOPY;  Surgeon: Geovani Willard MD;  Location: [facility];  Service: Endoscopy;  Laterality: N/A;    ESOPHAGOGASTRODUODENOSCOPY N/A 2019    Procedure: EGD (ESOPHAGOGASTRODUODENOSCOPY);  Surgeon: Geovani Willard MD;  Location: [facility];  Service: Endoscopy;  Laterality: N/A;    TUBAL LIGATION       Family History   Problem Relation Name Age of Onset    Hypertension Mother      Seizures Mother      Hypertension Father       Social History     Tobacco Use    Smoking status: Every Day     Current packs/day: 0.25     Average packs/day: 1 pack/day for 33.7 years (33.1 ttl pk-yrs)     Types: Cigarettes     Start date:     Smokeless tobacco: Never    Tobacco comments:     Enroll  in  [place]   Substance Use Topics    Alcohol use: Yes     Comment: occ    Drug use: Yes     Types: Marijuana     Comment: occ     Review of Systems    Musculoskeletal:  Positive for arthralgias and joint swelling.   All other systems reviewed and are negative.      Physical Exam     Initial Vitals [09/20/24 0948]   BP Pulse Resp Temp SpO2   122/76 69 20 98.2 °F (36.8 °C) 98 %      MAP       --         Physical Exam    Constitutional: She appears well-developed and well-nourished. She is not diaphoretic.   HENT:   Head: Normocephalic.   Eyes: Pupils are equal, round, and reactive to light.   Neck: Neck supple.   Normal range of motion.  Cardiovascular:  Normal rate and regular rhythm.           Pulmonary/Chest: Breath sounds normal. No respiratory distress.   Abdominal: Abdomen is soft. Bowel sounds are normal. She exhibits no distension. There is no abdominal tenderness. There is no rebound.   Musculoskeletal:         General: Tenderness (Patient has mild tenderness to right wrist on examination.  No obvious deformity.  Right wrist neurovascularly intact right hand neurovascularly intact.) present. No edema.      Cervical back: Normal range of motion and neck supple.     Neurological: She is alert and oriented to person, place, and time. GCS score is 15. GCS eye subscore is 4. GCS verbal subscore is 5. GCS motor subscore is 6.   Skin: Capillary refill takes less than 2 seconds. No erythema.   Psychiatric: She has a normal mood and affect. Thought content normal.         ED Course   Procedures  Labs Reviewed - No data to display       Imaging Results    None          Medications - No data to display  Medical Decision Making  Differential diagnosis include gout, arthritis, overuse syndrome, right wrist pain    Patient's symptoms consistent with possible arthritis versus overuse syndrome of right wrist.  Patient had no trauma or injury unlikely patient has fracture of right wrist.  Velcro wrist splint was applied by nursing staff.  Right upper extremity remained neurovascular intact pre and post Velcro wrist splint placement.  Patient will follow-up with PCP on  October 4th at previously scheduled appointment.    Will treat patient's pain with a short-term prescription of Norco.  Patient stable at time of discharge in no acute distress.  No life-threatening illnesses were found during ER visit today.  Patient was instructed to follow-up with PCP or other recommended specialist within the next 48-72 hours.  Patient was instructed to return to ER immediately for any worsening or concerning symptoms.  All discharge instructions discussed with patient, and patient agrees to comply with discharge instructions given today.     Risk  Prescription drug management.                                      Clinical Impression:  Final diagnoses:  [M25.531] Wrist pain, acute, right (Primary)          ED Disposition Condition    Discharge Stable          ED Prescriptions       Medication Sig Dispense Start Date End Date Auth. Provider    HYDROcodone-acetaminophen (NORCO) 5-325 mg per tablet Take 1 tablet by mouth every 8 (eight) hours as needed for Pain. 12 tablet 9/20/2024 9/24/2024 Florin Low NP          Follow-up Information       Follow up With Specialties Details Why Contact Info    Sam Hamilton MD Family Medicine In 2 weeks  [address] [place] [address]  [phone]               Florin Low NP  09/20/24 7850

## 2024-10-04 ENCOUNTER — CLINICAL SUPPORT (OUTPATIENT)
Dept: FAMILY MEDICINE | Facility: CLINIC | Age: 54
End: 2024-10-04
Payer: MEDICAID

## 2024-10-04 ENCOUNTER — APPOINTMENT (OUTPATIENT)
Dept: RADIOLOGY | Facility: CLINIC | Age: 54
End: 2024-10-04
Attending: STUDENT IN AN ORGANIZED HEALTH CARE EDUCATION/TRAINING PROGRAM
Payer: MEDICAID

## 2024-10-04 ENCOUNTER — TELEPHONE (OUTPATIENT)
Dept: ORTHOPEDICS | Facility: CLINIC | Age: 54
End: 2024-10-04
Payer: MEDICAID

## 2024-10-04 ENCOUNTER — OFFICE VISIT (OUTPATIENT)
Dept: FAMILY MEDICINE | Facility: CLINIC | Age: 54
End: 2024-10-04
Payer: MEDICAID

## 2024-10-04 VITALS
DIASTOLIC BLOOD PRESSURE: 68 MMHG | RESPIRATION RATE: 14 BRPM | HEART RATE: 84 BPM | HEIGHT: 65 IN | WEIGHT: 169.19 LBS | SYSTOLIC BLOOD PRESSURE: 110 MMHG | BODY MASS INDEX: 28.19 KG/M2 | OXYGEN SATURATION: 100 %

## 2024-10-04 DIAGNOSIS — M25.531 RIGHT WRIST PAIN: ICD-10-CM

## 2024-10-04 DIAGNOSIS — M25.531 RIGHT WRIST PAIN: Primary | ICD-10-CM

## 2024-10-04 DIAGNOSIS — I10 ESSENTIAL HYPERTENSION: ICD-10-CM

## 2024-10-04 DIAGNOSIS — F41.9 ANXIETY: ICD-10-CM

## 2024-10-04 DIAGNOSIS — Z12.31 SCREENING MAMMOGRAM FOR BREAST CANCER: ICD-10-CM

## 2024-10-04 LAB
ALBUMIN SERPL BCP-MCNC: 4.3 G/DL (ref 3.5–5.2)
ALP SERPL-CCNC: 75 U/L (ref 55–135)
ALT SERPL W/O P-5'-P-CCNC: 14 U/L (ref 10–44)
ANION GAP SERPL CALC-SCNC: 9 MMOL/L (ref 8–16)
AST SERPL-CCNC: 17 U/L (ref 10–40)
BILIRUB SERPL-MCNC: 0.8 MG/DL (ref 0.1–1)
BUN SERPL-MCNC: 10 MG/DL (ref 6–20)
CALCIUM SERPL-MCNC: 9.8 MG/DL (ref 8.7–10.5)
CCP AB SER IA-ACNC: 0.5 U/ML
CHLORIDE SERPL-SCNC: 106 MMOL/L (ref 95–110)
CO2 SERPL-SCNC: 24 MMOL/L (ref 23–29)
CREAT SERPL-MCNC: 0.9 MG/DL (ref 0.5–1.4)
EST. GFR  (NO RACE VARIABLE): >60 ML/MIN/1.73 M^2
GLUCOSE SERPL-MCNC: 92 MG/DL (ref 70–110)
POTASSIUM SERPL-SCNC: 4.2 MMOL/L (ref 3.5–5.1)
PROT SERPL-MCNC: 7.7 G/DL (ref 6–8.4)
RHEUMATOID FACT SERPL-ACNC: <13 IU/ML (ref 0–15)
SODIUM SERPL-SCNC: 139 MMOL/L (ref 136–145)
URATE SERPL-MCNC: 8 MG/DL (ref 2.4–5.7)

## 2024-10-04 PROCEDURE — 84550 ASSAY OF BLOOD/URIC ACID: CPT | Performed by: STUDENT IN AN ORGANIZED HEALTH CARE EDUCATION/TRAINING PROGRAM

## 2024-10-04 PROCEDURE — 99213 OFFICE O/P EST LOW 20 MIN: CPT | Mod: PBBFAC,25 | Performed by: STUDENT IN AN ORGANIZED HEALTH CARE EDUCATION/TRAINING PROGRAM

## 2024-10-04 PROCEDURE — 73110 X-RAY EXAM OF WRIST: CPT | Mod: TC,PO,RT

## 2024-10-04 PROCEDURE — 99999 PR PBB SHADOW E&M-EST. PATIENT-LVL III: CPT | Mod: PBBFAC,,, | Performed by: STUDENT IN AN ORGANIZED HEALTH CARE EDUCATION/TRAINING PROGRAM

## 2024-10-04 PROCEDURE — 86431 RHEUMATOID FACTOR QUANT: CPT | Performed by: STUDENT IN AN ORGANIZED HEALTH CARE EDUCATION/TRAINING PROGRAM

## 2024-10-04 PROCEDURE — 73110 X-RAY EXAM OF WRIST: CPT | Mod: 26,RT,, | Performed by: RADIOLOGY

## 2024-10-04 PROCEDURE — 86200 CCP ANTIBODY: CPT | Performed by: STUDENT IN AN ORGANIZED HEALTH CARE EDUCATION/TRAINING PROGRAM

## 2024-10-04 PROCEDURE — 80053 COMPREHEN METABOLIC PANEL: CPT | Performed by: STUDENT IN AN ORGANIZED HEALTH CARE EDUCATION/TRAINING PROGRAM

## 2024-10-04 PROCEDURE — 86038 ANTINUCLEAR ANTIBODIES: CPT | Performed by: STUDENT IN AN ORGANIZED HEALTH CARE EDUCATION/TRAINING PROGRAM

## 2024-10-04 RX ORDER — FLUOXETINE HYDROCHLORIDE 20 MG/1
20 CAPSULE ORAL DAILY
Qty: 90 CAPSULE | Refills: 1 | Status: SHIPPED | OUTPATIENT
Start: 2024-10-04 | End: 2025-04-02

## 2024-10-04 RX ORDER — HYDROCHLOROTHIAZIDE 25 MG/1
25 TABLET ORAL DAILY
Qty: 90 TABLET | Refills: 1 | Status: SHIPPED | OUTPATIENT
Start: 2024-10-04

## 2024-10-04 RX ORDER — DICLOFENAC SODIUM 10 MG/G
2 GEL TOPICAL 4 TIMES DAILY
Qty: 350 G | Refills: 0 | Status: SHIPPED | OUTPATIENT
Start: 2024-10-04

## 2024-10-04 NOTE — LETTER
October 4, 2024    Ami Kamara  [address]  [facility] [address]             [facility]  [address]  [facility] [address]  Phone: [phone]  Fax: [phone]   October 4, 2024     Patient: Ami Kamara   YOB: 1970   Date of Visit: 10/4/2024       To Whom it May Concern:    Ami Kamara was seen in my clinic on 10/4/2024. She may return to work on 10/07/2024 .    Please excuse her from any classes or work missed.    If you have any questions or concerns, please don't hesitate to call.    Sincerely,         SHELIA Sung Michael C., MD

## 2024-10-12 ENCOUNTER — HOSPITAL ENCOUNTER (EMERGENCY)
Facility: HOSPITAL | Age: 54
Discharge: HOME | End: 2024-10-12
Attending: STUDENT IN AN ORGANIZED HEALTH CARE EDUCATION/TRAINING PROGRAM
Payer: MEDICAID

## 2024-10-12 VITALS
BODY MASS INDEX: 27.75 KG/M2 | TEMPERATURE: 98 F | DIASTOLIC BLOOD PRESSURE: 60 MMHG | SYSTOLIC BLOOD PRESSURE: 132 MMHG | OXYGEN SATURATION: 98 % | WEIGHT: 166.56 LBS | HEIGHT: 65 IN | HEART RATE: 100 BPM | RESPIRATION RATE: 18 BRPM

## 2024-10-12 DIAGNOSIS — M79.641 RIGHT HAND PAIN: Primary | ICD-10-CM

## 2024-10-12 PROCEDURE — 96372 THER/PROPH/DIAG INJ SC/IM: CPT | Performed by: STUDENT IN AN ORGANIZED HEALTH CARE EDUCATION/TRAINING PROGRAM

## 2024-10-12 PROCEDURE — 63600175 PHARM REV CODE 636 W HCPCS: Performed by: STUDENT IN AN ORGANIZED HEALTH CARE EDUCATION/TRAINING PROGRAM

## 2024-10-12 PROCEDURE — 99284 EMERGENCY DEPT VISIT MOD MDM: CPT | Mod: 25

## 2024-10-12 RX ORDER — KETOROLAC TROMETHAMINE 30 MG/ML
15 INJECTION, SOLUTION INTRAMUSCULAR; INTRAVENOUS
Status: COMPLETED | OUTPATIENT
Start: 2024-10-12 | End: 2024-10-12

## 2024-10-12 RX ORDER — IBUPROFEN 600 MG/1
600 TABLET ORAL
Status: DISCONTINUED | OUTPATIENT
Start: 2024-10-12 | End: 2024-10-12

## 2024-10-12 RX ORDER — IBUPROFEN 600 MG/1
600 TABLET ORAL EVERY 6 HOURS PRN
Qty: 20 TABLET | Refills: 0 | Status: SHIPPED | OUTPATIENT
Start: 2024-10-12

## 2024-10-12 RX ADMIN — KETOROLAC TROMETHAMINE 15 MG: 30 INJECTION, SOLUTION INTRAMUSCULAR at 11:10

## 2024-10-12 NOTE — ED PROVIDER NOTES
Encounter Date: 10/12/2024       History     Chief Complaint   Patient presents with    Wrist Pain     Pt complains of right wrist pain. Hx of carpule tunnel/arthritis. Cannot get into ortho until after the new year.     54-year-old female with history of reflux and seizures presenting with right hand pain for the last month and a half.  Patient has been diagnosed with a scapholunate ligament disruption on x-ray, and that has appropriately  been placed in a forearm volar splint, and refer to orthopedics.  Patient states that she was unable to get an appointment until the end of January, three months away.  Denies inciting incidental trauma.  No fever, joint swelling.       Review of patient's allergies indicates:  No Known Allergies  Past Medical History:   Diagnosis Date    Anxiety     GERD (gastroesophageal reflux disease)     Seizures      Past Surgical History:   Procedure Laterality Date     SECTION      x2    COLONOSCOPY N/A 2019    Procedure: COLONOSCOPY;  Surgeon: Geovani Willard MD;  Location: [facility];  Service: Endoscopy;  Laterality: N/A;    ESOPHAGOGASTRODUODENOSCOPY N/A 2019    Procedure: EGD (ESOPHAGOGASTRODUODENOSCOPY);  Surgeon: Geovani Willard MD;  Location: [facility];  Service: Endoscopy;  Laterality: N/A;    TUBAL LIGATION       Family History   Problem Relation Name Age of Onset    Hypertension Mother      Seizures Mother      Hypertension Father       Social History     Tobacco Use    Smoking status: Every Day     Current packs/day: 0.25     Average packs/day: 1 pack/day for 33.8 years (33.1 ttl pk-yrs)     Types: Cigarettes     Start date:     Smokeless tobacco: Never    Tobacco comments:     Enroll  in  [employer]   Substance Use Topics    Alcohol use: Yes     Comment: occ    Drug use: Yes     Types: Marijuana     Comment: occ     Review of Systems   Constitutional:  Negative for fever.   HENT:  Negative for sore throat.    Respiratory:  Negative for shortness  of breath.    Cardiovascular:  Negative for chest pain.   Gastrointestinal:  Negative for nausea.   Genitourinary:  Negative for dysuria.   Musculoskeletal:  Negative for back pain.        R hand pain   Skin:  Negative for rash.   Neurological:  Negative for weakness.   Hematological:  Does not bruise/bleed easily.       Physical Exam     Initial Vitals [10/12/24 1127]   BP Pulse Resp Temp SpO2   132/60 100 18 98.1 °F (36.7 °C) 98 %      MAP       --         Physical Exam    Nursing note and vitals reviewed.  Constitutional: She appears well-developed.   HENT:   Head: Normocephalic.   Eyes: Pupils are equal, round, and reactive to light.   Neck:   Normal range of motion.  Cardiovascular:            No murmur heard.  Pulmonary/Chest: No respiratory distress.   Abdominal: Abdomen is soft.   Musculoskeletal:         General: No edema.      Cervical back: Normal range of motion.      Comments: Tenderness to palpation to the base of the palm.  No wrists swelling or erythema.  No increased warmth.  Patient was full sensation throughout all digits.  Pain when attempting to extend the thumb and index finger.  Cap refill normal.  Radial pulse 2 +.  Compartments soft.     Neurological: She is alert.   Skin: Skin is warm.   Psychiatric: She has a normal mood and affect.         ED Course   Procedures  Labs Reviewed - No data to display       Imaging Results    None          Medications   ketorolac injection 15 mg (15 mg Intramuscular Given 10/12/24 1134)     Medical Decision Making  Patient has persistent pain from a scapholunate dislocation/ligament damage.  No new trauma to suggest injury.  Patient unable to get orthopedic follow up for another three months, will place more expedited referral, and will put patient in touch with local orthopedics.  Patient was appropriately splinted at this time.  Will treat with analgesia.          Risk  Prescription drug management.                                      Clinical  Impression:  Final diagnoses:  [M79.641] Right hand pain (Primary)          ED Disposition Condition    Discharge Stable          ED Prescriptions    None       Follow-up Information       Follow up With Specialties Details Why Contact Info    Sam Hamilton MD Family Medicine Schedule an appointment as soon as possible for a visit in 2 days  [address] [facility] [address]  [phone]      [facility] - Emergency Dept Emergency Medicine  If symptoms worsen [address] [facility] [id number]  [phone]             Brain Fernandes MD  10/12/24 1142

## 2024-10-12 NOTE — DISCHARGE INSTRUCTIONS

## 2024-10-17 ENCOUNTER — TELEPHONE (OUTPATIENT)
Dept: ORTHOPEDICS | Facility: CLINIC | Age: 54
End: 2024-10-17
Payer: MEDICAID

## 2024-10-17 NOTE — TELEPHONE ENCOUNTER
Attempted to contact pt d/t [facility] referral for Rt hand pain. Pt is scheduled on 12/13/2024 with Gina.

## 2024-11-04 ENCOUNTER — CLINICAL SUPPORT (OUTPATIENT)
Dept: SMOKING CESSATION | Facility: CLINIC | Age: 54
End: 2024-11-04
Payer: MEDICAID

## 2024-11-04 DIAGNOSIS — F17.200 NICOTINE DEPENDENCE: Primary | ICD-10-CM

## 2024-11-04 PROCEDURE — 99407 BEHAV CHNG SMOKING > 10 MIN: CPT | Mod: S$PBB,,, | Performed by: FAMILY MEDICINE

## 2024-11-04 PROCEDURE — 99999 PR PBB SHADOW E&M-EST. PATIENT-LVL I: CPT | Mod: PBBFAC,,,

## 2024-11-04 NOTE — PROGRESS NOTES
Spoke with patient today in regard to smoking cessation progress for 12 month telephone follow up, she states she is not tobacco free at this time. Patient scheduled appointment to return to program for Quit #2. Informed patient of benefit period, future follow ups, and contact information if any further help or support is needed. Will resolve episode and complete smart form for Quit attempt #1.

## 2024-11-06 ENCOUNTER — HOSPITAL ENCOUNTER (EMERGENCY)
Facility: HOSPITAL | Age: 54
Discharge: HOME | End: 2024-11-06
Attending: STUDENT IN AN ORGANIZED HEALTH CARE EDUCATION/TRAINING PROGRAM
Payer: MEDICAID

## 2024-11-06 VITALS
RESPIRATION RATE: 20 BRPM | HEART RATE: 100 BPM | WEIGHT: 165.13 LBS | SYSTOLIC BLOOD PRESSURE: 125 MMHG | TEMPERATURE: 98 F | BODY MASS INDEX: 27.48 KG/M2 | OXYGEN SATURATION: 98 % | DIASTOLIC BLOOD PRESSURE: 70 MMHG

## 2024-11-06 DIAGNOSIS — B35.1 TOENAIL FUNGUS: ICD-10-CM

## 2024-11-06 DIAGNOSIS — M79.674 GREAT TOE PAIN, RIGHT: Primary | ICD-10-CM

## 2024-11-06 PROCEDURE — 99284 EMERGENCY DEPT VISIT MOD MDM: CPT

## 2024-11-06 RX ORDER — MUPIROCIN 20 MG/G
OINTMENT TOPICAL 3 TIMES DAILY
Qty: 15 G | Refills: 0 | Status: SHIPPED | OUTPATIENT
Start: 2024-11-06

## 2024-11-06 RX ORDER — NAPROXEN 500 MG/1
500 TABLET ORAL EVERY 12 HOURS PRN
Qty: 10 TABLET | Refills: 0 | Status: SHIPPED | OUTPATIENT
Start: 2024-11-06

## 2024-11-06 NOTE — ED PROVIDER NOTES
"Encounter Date: 2024       History     Chief Complaint   Patient presents with    Toe Injury     Pt arrives to the ed with c/o right great toe pain x 1 week "possible ingrown toe nail" per patient.      Ami Kamara is a 54 y.o. female with PMH of anxiety, GERD, seizure disorder presenting to the ED for evaluation of toe pain.  Patient presents with a several year history of right great toe pain.  She reports that her toenail was growing into her skin on the lateral side of her nail.  Pain is described as throbbing, currently rated 6/10 in severity.  She denies any redness, warmth, or drainage.  She denies history of DM.    The history is provided by the patient.     Review of patient's allergies indicates:  No Known Allergies  Past Medical History:   Diagnosis Date    Anxiety     GERD (gastroesophageal reflux disease)     Seizures      Past Surgical History:   Procedure Laterality Date     SECTION      x2    COLONOSCOPY N/A 2019    Procedure: COLONOSCOPY;  Surgeon: Geovani Willard MD;  Location: [facility];  Service: Endoscopy;  Laterality: N/A;    ESOPHAGOGASTRODUODENOSCOPY N/A 2019    Procedure: EGD (ESOPHAGOGASTRODUODENOSCOPY);  Surgeon: Geovani Willard MD;  Location: [facility];  Service: Endoscopy;  Laterality: N/A;    TUBAL LIGATION       Family History   Problem Relation Name Age of Onset    Hypertension Mother      Seizures Mother      Hypertension Father       Social History     Tobacco Use    Smoking status: Every Day     Current packs/day: 0.25     Average packs/day: 1 pack/day for 33.8 years (33.1 ttl pk-yrs)     Types: Cigarettes     Start date:     Smokeless tobacco: Never    Tobacco comments:     Enroll  in  [employer]   Substance Use Topics    Alcohol use: Yes     Comment: occ    Drug use: Yes     Types: Marijuana     Comment: occ     Review of Systems   Constitutional:  Negative for activity change, chills and fever.   HENT:  Negative for congestion, ear " discharge, ear pain, postnasal drip, sinus pressure, sinus pain and sore throat.    Respiratory:  Negative for cough, chest tightness and shortness of breath.    Cardiovascular:  Negative for chest pain.   Gastrointestinal:  Negative for abdominal distention, abdominal pain and nausea.   Genitourinary:  Negative for dysuria, frequency and urgency.   Musculoskeletal:  Positive for arthralgias (R great toe). Negative for back pain.   Skin:  Negative for rash.   Neurological:  Negative for dizziness, weakness, light-headedness and numbness.   Hematological:  Does not bruise/bleed easily.       Physical Exam     Initial Vitals [11/06/24 1136]   BP Pulse Resp Temp SpO2   125/70 100 20 98.1 °F (36.7 °C) 98 %      MAP       --         Physical Exam    Nursing note and vitals reviewed.  Constitutional: She appears well-developed and well-nourished.   HENT:   Head: Normocephalic and atraumatic.   Eyes: Conjunctivae and EOM are normal. Pupils are equal, round, and reactive to light.   Neck: Neck supple.   Normal range of motion.  Cardiovascular:  Normal rate, regular rhythm, normal heart sounds and intact distal pulses.           Pulmonary/Chest: Breath sounds normal.   Abdominal: Abdomen is soft. Bowel sounds are normal.   Musculoskeletal:         General: Normal range of motion.      Cervical back: Normal range of motion and neck supple.     Neurological: She is alert and oriented to person, place, and time. She has normal strength. GCS score is 15. GCS eye subscore is 4. GCS verbal subscore is 5. GCS motor subscore is 6.   Skin: Skin is warm and dry. Capillary refill takes less than 2 seconds.   R great toe with thickened nail plate with subungal hyperkeratosis   No secondary signs of infection    Psychiatric: She has a normal mood and affect. Her behavior is normal. Judgment and thought content normal.         ED Course   Procedures  Labs Reviewed - No data to display       Imaging Results    None          Medications -  No data to display  Medical Decision Making  Evaluation of a 54-year-old female presenting with right great toe pain  Symptoms have been ongoing for the past year   Exam with R great toe with thickened nail plate with subungal hyperkeratosis   No secondary signs of infection     Diff dx includes onychomycosis, ingrown nail, cellulitis        Problems Addressed:  Toenail fungus: acute illness or injury    Risk  Prescription drug management.  Risk Details: Stable for discharge home.  Outpatient referral to Podiatry placed for follow-up.  Will prescribe naproxen for pain control. Patient/caregiver voices understanding and feels comfortable with discharge plan.      The patient acknowledges that close follow up with medical provider is required. Instructed to follow up with PCP within 2 days. Patient was given specific return precautions. The patient agrees to comply with all instruction and directions given in the ER.                                        Clinical Impression:  Final diagnoses:  [M79.674] Great toe pain, right (Primary)  [B35.1] Toenail fungus          ED Disposition Condition    Discharge Stable          ED Prescriptions       Medication Sig Dispense Start Date End Date Auth. Provider    mupirocin (BACTROBAN) 2 % ointment Apply topically 3 (three) times daily. 15 g 11/6/2024 -- Sahara Thomas NP    naproxen (NAPROSYN) 500 MG tablet Take 1 tablet (500 mg total) by mouth every 12 (twelve) hours as needed (pain). Take with food. 10 tablet 11/6/2024 -- Sahara Thomas NP          Follow-up Information       Follow up With Specialties Details Why Contact Info    Sam Hamilton MD Family Medicine Schedule an appointment as soon as possible for a visit in 2 days  [address] [place], [place] [id number]  [phone]               Sahara Thomas NP  11/06/24 0755

## 2024-11-06 NOTE — Clinical Note
"Ami"Saadia Kamara was seen and treated in our emergency department on 11/6/2024.  She may return to work on 11/07/2024.       If you have any questions or concerns, please don't hesitate to call.      YARELIS RANDHAWA RN    "

## 2024-11-12 ENCOUNTER — TELEPHONE (OUTPATIENT)
Dept: SMOKING CESSATION | Facility: CLINIC | Age: 54
End: 2024-11-12
Payer: MEDICAID

## 2024-11-12 NOTE — TELEPHONE ENCOUNTER
Attempted to contact patient regarding smoking cessation intake appointment scheduled for today at 2:30 pm. There was no answer at this time. Left message with contact information.

## 2024-11-26 ENCOUNTER — TELEPHONE (OUTPATIENT)
Dept: FAMILY MEDICINE | Facility: CLINIC | Age: 54
End: 2024-11-26
Payer: MEDICAID

## 2024-11-26 NOTE — TELEPHONE ENCOUNTER
Spoke to pt, informed her that a referral was placed to podiatry in [place] but she is requesting something close to home. Instructed pt to call the phone number on the back of her insurance card to get a list of specialist in her network. Pt will return call to the clinic with that information.

## 2024-11-26 NOTE — TELEPHONE ENCOUNTER
----- Message from Hussein sent at 2024  8:50 AM CST -----  Contact: pt  Ami Kamara  MRN: [id number]  : 1970  PCP: Sam Hamilton  Home Phone      [phone]  Work Phone      Not on file.  Mobile          [phone]      MESSAGE:     Pt called wanting to speak with nurse about sending out a referral to [facility] for a foot specialist. Fax # [phone]        Please advise  [phone]

## 2024-12-03 ENCOUNTER — TELEPHONE (OUTPATIENT)
Dept: FAMILY MEDICINE | Facility: CLINIC | Age: 54
End: 2024-12-03
Payer: MEDICAID

## 2024-12-03 DIAGNOSIS — M25.531 RIGHT WRIST PAIN: Primary | ICD-10-CM

## 2024-12-03 RX ORDER — DICLOFENAC SODIUM 75 MG/1
75 TABLET, DELAYED RELEASE ORAL 2 TIMES DAILY PRN
Qty: 60 TABLET | Refills: 0 | Status: SHIPPED | OUTPATIENT
Start: 2024-12-03

## 2024-12-03 NOTE — TELEPHONE ENCOUNTER
----- Message from Hussein sent at 12/3/2024 10:19 AM CST -----  Contact: pt  Ami Kamara  MRN: [id number]  : 1970  PCP: Sam Hamilton  Home Phone      [phone]  Work Phone      Not on file.  Mobile          [phone]      MESSAGE:     Pt called wanting to speak with nurse about being called in some medication for pain, because of her hernia. LOV 10/4/24            Please advise  [phone]

## 2024-12-09 ENCOUNTER — TELEPHONE (OUTPATIENT)
Dept: SMOKING CESSATION | Facility: CLINIC | Age: 54
End: 2024-12-09
Payer: MEDICAID

## 2024-12-09 NOTE — TELEPHONE ENCOUNTER
Spoke with patient regarding smoking cessation program. Pt states that she would like to join the program again, but cannot commit to coming into the clinic. I encouraged pt to download the [facility] to her phone and notified her that virtual appointments are available. MsMelissa Ami would like for me to call her back later in the week.

## 2024-12-13 ENCOUNTER — OFFICE VISIT (OUTPATIENT)
Dept: ORTHOPEDICS | Facility: CLINIC | Age: 54
End: 2024-12-13
Payer: MEDICAID

## 2024-12-13 VITALS
SYSTOLIC BLOOD PRESSURE: 120 MMHG | BODY MASS INDEX: 28.39 KG/M2 | OXYGEN SATURATION: 98 % | HEIGHT: 65 IN | WEIGHT: 170.38 LBS | RESPIRATION RATE: 18 BRPM | HEART RATE: 75 BPM | DIASTOLIC BLOOD PRESSURE: 76 MMHG

## 2024-12-13 DIAGNOSIS — M25.531 RIGHT WRIST PAIN: ICD-10-CM

## 2024-12-13 DIAGNOSIS — R20.0 FINGER NUMBNESS: ICD-10-CM

## 2024-12-13 DIAGNOSIS — M65.4 DE QUERVAIN'S TENOSYNOVITIS, RIGHT: Primary | ICD-10-CM

## 2024-12-13 PROCEDURE — 99999 PR PBB SHADOW E&M-EST. PATIENT-LVL IV: CPT | Mod: PBBFAC,,, | Performed by: PHYSICIAN ASSISTANT

## 2024-12-13 PROCEDURE — 99214 OFFICE O/P EST MOD 30 MIN: CPT | Mod: PBBFAC | Performed by: PHYSICIAN ASSISTANT

## 2024-12-13 RX ORDER — MELOXICAM 15 MG/1
15 TABLET ORAL DAILY
Qty: 30 TABLET | Refills: 0 | Status: SHIPPED | OUTPATIENT
Start: 2024-12-13

## 2024-12-13 NOTE — PROGRESS NOTES
Subjective:      Patient ID: Aim Kamara is a 54 y.o. female.    Chief Complaint: Pain of the Right Hand    Review of patient's allergies indicates:  No Known Allergies   55 yo RHD F presents to clinic with c/o right hand pain x few months.  No injury or trauma.  Sharp pain to radial wrist that is worse with grasping/lifting things and improves some with rest.  No swelling, redness, warmth.  She does have intermittent numbness/tingling to thumb, index finger, and middle finger that is worse at night and when she wakes up in the morning.  Was seen by PCP and ED, xrays showed scapholunate disruption.  She denies any pain to dorsal wrist.  She has an appointment scheduled to see Dr. Mason next month but wanted to be seen sooner.        Review of Systems   Constitutional: Negative for chills, diaphoresis and fever.   HENT:  Negative for congestion, ear discharge and ear pain.    Eyes:  Negative for blurred vision, discharge, double vision and pain.   Cardiovascular:  Negative for chest pain, claudication and cyanosis.   Respiratory:  Negative for cough, hemoptysis and shortness of breath.    Endocrine: Negative for cold intolerance and heat intolerance.   Skin:  Negative for color change, dry skin, itching and rash.   Musculoskeletal:  Positive for joint pain. Negative for arthritis, back pain, falls, gout, joint swelling, muscle weakness and neck pain.   Gastrointestinal:  Negative for abdominal pain and change in bowel habit.   Neurological:  Positive for numbness and paresthesias. Negative for brief paralysis, disturbances in coordination and dizziness.   Psychiatric/Behavioral:  Negative for altered mental status and depression.          Objective:          General    Constitutional: She is oriented to person, place, and time. She appears well-developed and well-nourished. No distress.   HENT:   Head: Atraumatic.   Eyes: EOM are normal. Right eye exhibits no discharge. Left eye exhibits no discharge.    Cardiovascular:  Normal rate.            Pulmonary/Chest: Effort normal. No respiratory distress.   Abdominal: Soft.   Neurological: She is alert and oriented to person, place, and time.   Psychiatric: She has a normal mood and affect. Her behavior is normal.             Right Hand/Wrist Exam     Inspection   Scars: Wrist - absent Hand -  absent  Effusion: Wrist - absent Hand -  absent  Bruising: Wrist - absent Hand -  absent  Deformity: Wrist - deformity Hand -  deformity    Range of Motion     Wrist   Extension:  abnormal   Flexion:  abnormal   Pronation:  abnormal   Supination:  abnormal     Tests   Phalens Sign: negative  Tinel's sign (median nerve): positive  Finkelstein's test: positive  Carpal Tunnel Compression Test: positive  Cubital Tunnel Compression Test: negative      Other     Neuorologic Exam    Median Distribution: normal  Ulnar Distribution: normal  Radial Distribution: normal    Comments:  Limited exam due to pt discomfort  No tenderness to dorsal wrist      Left Hand/Wrist Exam     Inspection   Scars: Wrist - absent Hand -  absent  Effusion: Wrist - absent Hand -  absent  Bruising: Wrist - absent Hand -  absent  Deformity: Wrist - absent Hand -  absent    Range of Motion     Wrist   Extension:  normal   Flexion:  normal   Pronation:  normal   Supination:  normal     Tests   Phalens Sign: negative  Tinel's sign (median nerve): negative  Finkelstein's test: negative  Carpal Tunnel Compression Test: negative  Cubital Tunnel Compression Test: negative      Other     Sensory Exam  Median Distribution: normal  Ulnar Distribution: normal  Radial Distribution: normal      Right Elbow Exam     Tests   Tinel's sign (cubital tunnel): negative      Left Elbow Exam     Tests   Tinel's sign (cubital tunnel): negative        Vascular Exam       Capillary Refill  Right Hand: normal capillary refill  Left Hand: normal capillary refill                  Assessment:         Xray Right Wrist 10/4/24:  There is  widening of the scapholunate interval suggesting scapholunate ligament disruption. There are degenerative changes of the radiocarpal joint. No fracture or dislocation.       Encounter Diagnoses   Name Primary?    De Quervain's tenosynovitis, right Yes    Right wrist pain     Finger numbness     De Quervain's tenosynovitis, right    Right wrist pain  -     Ambulatory referral/consult to Orthopedics    Finger numbness  -     EMG W/ ULTRASOUND AND NERVE CONDUCTION TEST 2 Extremities; Future               Plan:         I made the decision to obtain old records of the patient including previous notes and imaging. New imaging was ordered today of the extremity or extremities evaluated.     The total face-to-face encounter time with this patient was 45 minutes and greater than 50% of of the encounter time was spent counseling the patient, coordinating care, and education regarding the pathology of his/her diagnosis. We have discussed a variety of treatment options including medications, bracing, injections, occupational therapy and surgery. Pt is requesting brace and occupational therapy at this time. She also wants to see Dr. Mason as scheduled.    1. Mobic 15 mg 1 time daily PRN for pain management. Stop any other NSAIDs.  2. Thumb spica brace as directed.  3. OT referral.  4. Ice compress to the affected area 2-3x a day for 15-20 minutes as needed for pain management.  5. RTC as scheduled for appointment with Dr. Mason, sooner if needed.      Patient voices understanding of and agreement with treatment plan. All of the patient's questions were answered and the patient will contact us if she has any questions or concerns in the interim.

## 2025-01-04 DIAGNOSIS — M25.531 RIGHT WRIST PAIN: ICD-10-CM

## 2025-01-04 NOTE — TELEPHONE ENCOUNTER
Care Due:                  Date            Visit Type   Department     Provider  --------------------------------------------------------------------------------                                EP -                              PRIMARY      [facility] FAMILY  Last Visit: 10-      CARE ([facility])   MEDICINE       Sam Hamilton  Next Visit: None Scheduled  None         None Found                                                            Last  Test          Frequency    Reason                     Performed    Due Date  --------------------------------------------------------------------------------    CBC.........  12 months..  diclofenac...............  04- 03-    [facility] Embedded Care Due Messages. Reference number: [id number].   1/04/2025 12:29:25 AM CST

## 2025-01-06 ENCOUNTER — CLINICAL SUPPORT (OUTPATIENT)
Dept: REHABILITATION | Facility: HOSPITAL | Age: 55
End: 2025-01-06
Attending: PHYSICIAN ASSISTANT
Payer: MEDICAID

## 2025-01-06 DIAGNOSIS — M25.631 DECREASED RANGE OF MOTION OF RIGHT WRIST: ICD-10-CM

## 2025-01-06 DIAGNOSIS — M65.4 DE QUERVAIN'S TENOSYNOVITIS, RIGHT: ICD-10-CM

## 2025-01-06 DIAGNOSIS — R29.898 DECREASED GRIP STRENGTH: ICD-10-CM

## 2025-01-06 DIAGNOSIS — M25.531 RIGHT WRIST PAIN: ICD-10-CM

## 2025-01-06 DIAGNOSIS — M25.531 WRIST PAIN, ACUTE, RIGHT: Primary | ICD-10-CM

## 2025-01-06 PROCEDURE — 97165 OT EVAL LOW COMPLEX 30 MIN: CPT | Mod: PN

## 2025-01-07 PROBLEM — R29.898 DECREASED GRIP STRENGTH: Status: ACTIVE | Noted: 2025-01-07

## 2025-01-07 PROBLEM — M25.531 WRIST PAIN, ACUTE, RIGHT: Status: ACTIVE | Noted: 2025-01-07

## 2025-01-07 PROBLEM — M25.631 DECREASED RANGE OF MOTION OF RIGHT WRIST: Status: ACTIVE | Noted: 2025-01-07

## 2025-01-07 RX ORDER — DICLOFENAC SODIUM 75 MG/1
TABLET, DELAYED RELEASE ORAL
Qty: 60 TABLET | Refills: 0 | Status: SHIPPED | OUTPATIENT
Start: 2025-01-07

## 2025-01-07 NOTE — PLAN OF CARE
[facility]  Occupational Therapy Initial Evaluation    Date: 1/6/2025  Name: Ami Kamara  Clinic Number: [id number]    Therapy Diagnosis:   Encounter Diagnoses   Name Primary?    De Quervain's tenosynovitis, right     Right wrist pain     Wrist pain, acute, right Yes    Decreased range of motion of right wrist     Decreased  strength      Physician: Gina Leger PA-C    Physician Orders: OT Evaluate and treat  Medical Diagnosis: De Quervain's Tenosynovitis   Surgical Procedure and Date: NA  Evaluation Date: 1/6/2025  Insurance Authorization Period Expiration: 12/13/2024 - 12/13./2025  Plan of Care Certification Period: 1/6/2025 - 3/6/2025  Progress Note Due: 2/6/2025   Date of Return to MD: 1/22/2025  Visit # / Visits authorized: Evaluation  FOTO: 1 / 3    Time In:11:00  Time Out: 11:45  Total Appointment Time (timed & untimed codes): 45 minutes    SUBJECTIVE     Date of Onset: September of 2024    History of Current Condition/Mechanism of Injury: Ami reports: Pt states that injury is non traumatic and began slowly over time worsening in September of 2024. Pt reported to [facility] ED where an xray was performed showing scapholunate disruption. She was referred to orthopedics and provided a splint with referral to OT. She had to cancel eval several times due to work.    Falls: None    Involved Side: right   Dominant Side: Right  Imaging: X-ray   Prior Therapy: None  Occupation:  [employer]   Working presently: employed    Functional Limitations/Social History:    Previous functional status includes: Independent with all ADLs.     Current Functional Status   Home/Living environment: lives with their son      Limitation of Functional Status as follows:   ADLs/IADLs:     - Feeding: MOD I    - Bathing: MOD I    - Dressing/Grooming: MOD I    - Driving: Ind  Pain:  Functional Pain Scale Rating 0-10: Current 8/10, worst 10/10, best 6/10   Location: Radial aspect of wrist with Pt  pointing to distal radial head  Description: Aching, throbbing  Aggravating Factors: Night Time, Extension, Flexing, and Lifting  Easing Factors: None    Medical History:   Past Medical History:   Diagnosis Date    Anxiety     GERD (gastroesophageal reflux disease)     Seizures        Surgical History:    has a past surgical history that includes  section; Tubal ligation; Esophagogastroduodenoscopy (N/A, 2019); and Colonoscopy (N/A, 2019).    Medications:   has a current medication list which includes the following prescription(s): diclofenac, fluoxetine, hydrochlorothiazide, meloxicam, and mupirocin.    Allergies:   Review of patient's allergies indicates:  No Known Allergies       OBJECTIVE   Dart thrower: Pos  Finkelstein: Pos      Active Range of Motion:  (Measured in degrees)   Right Left   Wrist Extension  25 wnl   Wrist Flexion 0    Radial Deviation 0    Ulnar Deviation 10      Range of Motion Hand  (Active/Passive)  Measured in degrees.   2025    Right Left        Digit 2:  MP  65 80                 PIP     90 90                 DIP 30 35   ALMEIDA 185 205   Digit 3:  MP 70 80                 PIP 90 90                 DIP 30 35   ALMEIDA 190 205   Digit 4:   MP 65 80                  PIP 90 90                  DIP 30 40   ALMEIDA 195 210   Digit 5:  MP 50 80                 PIP 90 90                 DIP 30 60    180 230   CMC:    MP 45 60                 IP 45 60     Manual Muscle Test   Right Left   Elbow Flexion 5/5 5/5   Elbow Extension 5/5 5/5   Elbow Supination 5/5 5/5   Elbow Pronation 5/5 5/5   Wrist Extension  3/5 5/5   Wrist Flexion 3/5 5/5   Radial Deviation 3/5 5/5   Ulnar Deviation 3/5 5/5        Strength (Dynamometer/Measured in pounds on Rung II) and Pinch Strength (Pinch Gauge)   2025    Right Left   Composite 15 50   Lateral Pinch 2 10   Tripod Pinch 2 9     Opposition (Fine Motor Skills/Dexterity):   Reduced opposition with right hand able to oppose to first  and second digits with pain.   9 hole peg:  Right:  Left: 44.54    Sensation: wnl    Edema. Measured in centimeters.   1/7/2025 1/7/2025    Right Left   Wrist Crease     MCPs 21 21     Digits 2-5 Right Left   Index:       P1 6.8 6.5    PIP 6.6 6.4   P2 5.7 5.5    DIP     Long:       P1 7 6.5    PIP 7.1 6.6   P2 6 5.6   CMC     Prox. Phalanx 6.8 6.3   IP 7.3 6.5   Distal Phalanx          Limitation/Restriction for FOTO Survey    Therapist reviewed FOTO scores for Ami Kamara on 1/6/2025.   FOTO documents entered into [email or web address] - see Media section.    Limitation Score: See media section         Patient Education and Home Exercises      Education provided:   - HEP    Written Home Exercises Provided: yes.  Exercises were reviewed and Ami was able to demonstrate them prior to the end of the session.  Ami demonstrated good  understanding of the education provided. See EMR under Patient Instructions for exercises provided during therapy sessions.     Pt was advised to perform these exercises free of pain, and to stop performing them if pain occurs.    Patient/Family Education: role of OT, goals for OT, scheduling/cancellations - pt verbalized understanding. Discussed insurance limitations with patient.      ASSESSMENT     Ami Kamara is a 54 y.o. female referred to outpatient occupational therapy and presents with a medical diagnosis of De Quervain's.  Patient presents with the following therapy deficits: Decreased ROM, Decreased  strength, Decreased pinch strength, Decreased muscle strength, Decreased functional hand use, Increased pain, Edema, and Joint Stiffness and demonstrates limitations as described in the chart below. Following medical record review it is determined that pt will benefit from occupational therapy services in order to maximize pain free and/or functional use of right hand. The following goals were discussed with the patient and patient is in agreement with them as to be  addressed in the treatment plan. The patient's rehab potential is Good.     Anticipated barriers to occupational therapy: None  Pt has no cultural, educational or language barriers to learning provided.    Profile and History Assessment of Occupational Performance Level of Clinical Decision Making Complexity Score   Occupational Profile:   Ami Kamara is a 54 y.o. female who lives with their family and is currently employed Ami Kamara has difficulty with  ADLs and IADLs as listed previously, which  Affecting herdaily functional abilities.      Comorbidities:    has a past medical history of Anxiety, GERD (gastroesophageal reflux disease), and Seizures.    Medical and Therapy History Review:   Brief               Performance Deficits    Physical:  Joint Mobility  Joint Stability  Muscle Power/Strength  Edema   Strength  Pinch Strength  Fine Motor Coordination  Pain    Cognitive:  No Deficits    Psychosocial:    No Deficits     Clinical Decision Making:  low    Assessment Process:  Problem-Focused Assessments    Modification/Need for Assistance:  Not Necessary    Intervention Selection:  Limited Treatment Options       low  Based on PMHX, co morbidities , data from assessments and functional level of assistance required with task and   presentation directly impacting function.       The following goals were discussed with the patient and patient is in agreement with them as to be addressed in the treatment plan.       Goals:   The following goals were discussed with the patient and patient is in agreement with them as to be addressed in the treatment plan.     Short Term Goals (STGs); to be met within 4 weeks.  STG #1: Pt will report 7 out of 10 pain level at worst in right upper extremity.  STG #2: Pt will demonstrate increased ROM of 20 degrees flexion and extension of wrist in order increase functional use of UE  STG #3: Pt will demonstrate increased  strength of 10#s in order to  increase fine motor skills and dexterity.  STG #4: Pt will complete the 9 peg strength test in order to improve fine motor dexterity.  STG #5: Pt will demonstrate independence with issued HEP.   STG #6:  Pt will demonstrate increased ROM of 10 ALMEIDA in hand in order increase functional use of UE    Long Term Goals (LTGs); to be met by discharge.  LTG #1: Pt will report a pain level of 4 out of 10 at worst in left upper extremity.   LTG #2: Pt will demo improved FOTO score to predicted level.   LTG #3: Pt will return to prior level of function for ADL.   LTG #4:  Pt will demonstrate increased ROM of 15 ALMEIDA in hand in order increase functional use of upper extremity  LTG #5: Pt will demonstrate increased ROM of 20 degrees flexion and extension of wrist in order increase functional use of UE  LTG #6: Pt will demonstrate increased  strength of 10#s in order to increase fine motor skills and dexterity.  PLAN   Plan of Care Certification: 1/6/2025 to 3/6/2025.     Outpatient Occupational Therapy 1 - 2 times weekly for 6 weeks to include the following interventions: Paraffin, Fluidotherapy, Manual therapy/joint mobilizations, Modalities for pain management, US 3 mhz, Therapeutic exercises/activities., Strengthening, Orthotic Fabrication/Fit/Training, Edema Control, and Joint Protection.      Santiago Harrison OT      I CERTIFY THE NEED FOR THESE SERVICES FURNISHED UNDER THIS PLAN OF TREATMENT AND WHILE UNDER MY CARE  Physician's comments:      Physician's Signature: ___________________________________________________

## 2025-01-24 ENCOUNTER — TELEPHONE (OUTPATIENT)
Dept: ORTHOPEDICS | Facility: CLINIC | Age: 55
End: 2025-01-24
Payer: MEDICAID

## 2025-01-24 ENCOUNTER — DOCUMENTATION ONLY (OUTPATIENT)
Dept: REHABILITATION | Facility: HOSPITAL | Age: 55
End: 2025-01-24
Payer: MEDICAID

## 2025-01-24 NOTE — PROGRESS NOTES
Pt no call/no show for apt today. Pt called and she reported she forgot. Pt reminded of next apt on 1/29/25 at 1:45 PM.     Maggie FAJARDO

## 2025-01-29 ENCOUNTER — CLINICAL SUPPORT (OUTPATIENT)
Dept: REHABILITATION | Facility: HOSPITAL | Age: 55
End: 2025-01-29
Payer: MEDICAID

## 2025-01-29 DIAGNOSIS — M25.531 WRIST PAIN, ACUTE, RIGHT: Primary | ICD-10-CM

## 2025-01-29 DIAGNOSIS — R29.898 DECREASED GRIP STRENGTH: ICD-10-CM

## 2025-01-29 DIAGNOSIS — M25.631 DECREASED RANGE OF MOTION OF RIGHT WRIST: ICD-10-CM

## 2025-01-29 PROCEDURE — 97530 THERAPEUTIC ACTIVITIES: CPT | Mod: PN,CO

## 2025-01-29 NOTE — PROGRESS NOTES
"  Occupational Therapy Daily Treatment Note     Date: 1/29/2025  Name: Ami Kamara  MRN: [id number]    Diagnosis:   Encounter Diagnoses   Name Primary?    Wrist pain, acute, right Yes    Decreased range of motion of right wrist     Decreased  strength      Referring Physician: Gina Leger PA-C    Physician Orders: OT Evaluate and treat  Medical Diagnosis: De Quervain's Tenosynovitis   Surgical Procedure and Date: NA  Evaluation Date: 1/6/2025  Insurance Authorization Period Expiration: 12/13/2024 - 12/13./2025  Plan of Care Certification Period: 1/6/2025 - 3/6/2025  Progress Note Due: 2/6/2025   Date of Return to MD: 4/23/25    Visit # / Visits authorized: 1 / 20  TANA visit number: 1  Time In: 1:45 PM  Time Out: 2:30 PM  Total Billable Time: 45 minutes    Precautions:  Standard      Subjective     Pt reports: "Its alright, when I take off my brace it hurts a lot."  she was compliant with home exercise program given last session.     Pain: 6/10  Location: right wrists      Objective     Treatment consist of the following:     Ami received the following direct contact / supervised modalities after being cleared for contradictions:   -Cryotherapy to (R) hand to decreased inflammation, pain, and soreness while completing therapeutic exercise below:    Ami received the following manual therapy techniques:   -Analgesic pain management message to reduce risk of pain and inflammation post session with decreased muscle spasms and trigger points noted in over CMC region.     Ami participated in dynamic functional therapeutic activities, therapeutic exercise, and neuromuscular re-education Kinesthetic and Sense to improve functional performance, including:  -Thumb radial abduction/adduction 2 x 10 with pink block  -Thumb extension 2 x 10 with pink block  -Thumb oneill abduction 2 x 10 with pink block  -Thumb oneill adduction 2 x 10 with pink block  -Opposition to tip of 4th digit 2 x 10 (unable to reach " 5th digit) with pink block  -Composite rist with Pink block x 30  -Retrieving ~30 beans with yellow clip  -Harriman manipulation: putting coins onto small cup ~20 coin    Home Exercises and Education Provided     Education provided:   - Roles and goals of Occupational therapy  - Occupational therapy plan of care  - Progress towards goals     Written Home Exercises Provided: Patient instructed to cont prior HEP.  Exercises were reviewed and Ami was able to demonstrate them prior to the end of the session.  Ami demonstrated good  understanding of the HEP provided.   .   See EMR under Patient Instructions for exercises provided prior visit.        Assessment     Pt would continue to benefit from skilled OT. Pt tolerated session well with moderate reports of increase in pain. Pt also reported she is having difficulty with other thumb as well. Reported it is very painful and its popping. Will inform OTR about possibly getting referral for other hand as well. Pt reported slight decrease pain post session. Occupational therapist /FAJARDO collaboration to discuss POC, improvements, and d/c planning on todays date.      Ami is progressing well towards her goals and there are no updates to goals at this time. Pt prognosis is Good.     Pt will continue to benefit from skilled OT intervention.    Patient continues to demonstrate limitation  with  ROM, Joint mobility, Stiffness, Decreased fine motor coordination, Decreased functional use, Impaired sensation, Decreased strength, Continued pain, and Continued inflammation     Goals:  The following goals were discussed with the patient and patient is in agreement with them as to be addressed in the treatment plan.      Short Term Goals (STGs); to be met within 4 weeks.  STG #1: Pt will report 7 out of 10 pain level at worst in right upper extremity.  STG #2: Pt will demonstrate increased ROM of 20 degrees flexion and extension of wrist in order increase functional use of UE  STG  #3: Pt will demonstrate increased  strength of 10#s in order to increase fine motor skills and dexterity.  STG #4: Pt will complete the 9 peg strength test in order to improve fine motor dexterity.  STG #5: Pt will demonstrate independence with issued HEP.   STG #6:  Pt will demonstrate increased ROM of 10 ALMEIDA in hand in order increase functional use of UE     Long Term Goals (LTGs); to be met by discharge.  LTG #1: Pt will report a pain level of 4 out of 10 at worst in left upper extremity.       LTG #2: Pt will demo improved FOTO score to predicted level.   LTG #3: Pt will return to prior level of function for ADL.   LTG #4:  Pt will demonstrate increased ROM of 15 ALMEIDA in hand in order increase functional use of upper extremity  LTG #5: Pt will demonstrate increased ROM of 20 degrees flexion and extension of wrist in order increase functional use of UE  LTG #6: Pt will demonstrate increased  strength of 10#s in order to increase fine motor skills and dexterity.    Plan   Plan of Care Certification: 1/6/2025 to 3/6/2025.      Outpatient Occupational Therapy 1 - 2 times weekly for 6 weeks to include the following interventions: Paraffin, Fluidotherapy, Manual therapy/joint mobilizations, Modalities for pain management, US 3 mhz, Therapeutic exercises/activities., Strengthening, Orthotic Fabrication/Fit/Training, Edema Control, and Joint Protection.    Updates/Grading for next session: N/A      TANA Nails/ERIC

## 2025-02-05 ENCOUNTER — CLINICAL SUPPORT (OUTPATIENT)
Dept: REHABILITATION | Facility: HOSPITAL | Age: 55
End: 2025-02-05
Payer: MEDICAID

## 2025-02-05 DIAGNOSIS — M25.631 DECREASED RANGE OF MOTION OF RIGHT WRIST: ICD-10-CM

## 2025-02-05 DIAGNOSIS — R29.898 DECREASED GRIP STRENGTH: ICD-10-CM

## 2025-02-05 DIAGNOSIS — M25.531 WRIST PAIN, ACUTE, RIGHT: Primary | ICD-10-CM

## 2025-02-05 PROCEDURE — 97530 THERAPEUTIC ACTIVITIES: CPT | Mod: PN

## 2025-02-05 NOTE — PROGRESS NOTES
"  Occupational Therapy Daily Treatment Note     Date: 2/5/2025  Name: Ami Kamara  MRN: [id number]    Diagnosis:   Encounter Diagnoses   Name Primary?    Wrist pain, acute, right Yes    Decreased range of motion of right wrist     Decreased  strength      Referring Physician: Gina Leger PA-C    Physician Orders: OT Evaluate and treat  Medical Diagnosis: De Quervain's Tenosynovitis   Surgical Procedure and Date: NA  Evaluation Date: 1/6/2025  Insurance Authorization Period Expiration: 12/13/2024 - 12/13./2025  Plan of Care Certification Period: 1/6/2025 - 3/6/2025  Progress Note Due: 2/6/2025   Date of Return to MD: 4/23/25    Visit # / Visits authorized: 1 / 20  TANA visit number: 1  Time In: 3:25 PM (Pt running late)  Time Out: 2:30 PM  Total Billable Time: 30 minutes    Precautions:  Standard      Subjective     Pt reports: "It feels better today. In the morning when I take my brace off it hurts a lot."   she was compliant with home exercise program given last session.     Pain: 6/10  Location: right wrists      Objective     Treatment consist of the following:     Paraffin Wax: Applied to right hand and wrist to reduce pain and increase muscle elasticity prior to session to facilitate participation in therapy for 8 minutes.     Ami received the following direct contact / supervised modalities after being cleared for contradictions for 5 minutes:   -Cryotherapy to (R) hand to decreased inflammation, pain, and soreness while completing therapeutic exercise below:    Ami received the following manual therapy techniques:   -Analgesic pain management message to reduce risk of pain and inflammation post session with decreased muscle spasms and trigger points noted in over CMC region.     Ami participated in dynamic functional therapeutic activities, therapeutic exercise, and neuromuscular re-education Kinesthetic and Sense to improve functional performance for 22 minutes including:  Wrist range " of motion including:    Ulnar/radial deviation: 20 each   Flexion/extension: 20 each  Thumb radial adduction/abduction with extension: 30 reps   Radial abduction/adduction resisted with yellow band: 20 reps each  Palmar abduction/adduction resisted with yellow band: 20 reps each  Green sponge opposition: 20 reps each  Theraball squeezers: 20 reps     Home Exercises and Education Provided     Education provided:   - Roles and goals of Occupational therapy  - Occupational therapy plan of care  - Progress towards goals     Written Home Exercises Provided: Patient instructed to cont prior HEP.  Exercises were reviewed and Ami was able to demonstrate them prior to the end of the session.  Ami demonstrated good  understanding of the HEP provided.   .   See EMR under Patient Instructions for exercises provided prior visit.        Assessment     Pt would continue to benefit from skilled OT. Pt reporting some decreased pain to wrist today. She states her splint remains very uncomfortable. Will fabricate new support splint for night wear to reduce pain and inflammation at next session.   Ami is progressing well towards her goals and there are no updates to goals at this time. Pt prognosis is Good.     Pt will continue to benefit from skilled OT intervention.    Patient continues to demonstrate limitation  with  ROM, Joint mobility, Stiffness, Decreased fine motor coordination, Decreased functional use, Impaired sensation, Decreased strength, Continued pain, and Continued inflammation     Goals:  The following goals were discussed with the patient and patient is in agreement with them as to be addressed in the treatment plan.      Short Term Goals (STGs); to be met within 4 weeks.  STG #1: Pt will report 7 out of 10 pain level at worst in right upper extremity.  STG #2: Pt will demonstrate increased ROM of 20 degrees flexion and extension of wrist in order increase functional use of UE  STG #3: Pt will demonstrate  increased  strength of 10#s in order to increase fine motor skills and dexterity.  STG #4: Pt will complete the 9 peg strength test in order to improve fine motor dexterity.  STG #5: Pt will demonstrate independence with issued HEP.   STG #6:  Pt will demonstrate increased ROM of 10 ALMEIDA in hand in order increase functional use of UE     Long Term Goals (LTGs); to be met by discharge.  LTG #1: Pt will report a pain level of 4 out of 10 at worst in left upper extremity.       LTG #2: Pt will demo improved FOTO score to predicted level.   LTG #3: Pt will return to prior level of function for ADL.   LTG #4:  Pt will demonstrate increased ROM of 15 ALMEIDA in hand in order increase functional use of upper extremity  LTG #5: Pt will demonstrate increased ROM of 20 degrees flexion and extension of wrist in order increase functional use of UE  LTG #6: Pt will demonstrate increased  strength of 10#s in order to increase fine motor skills and dexterity.    Plan   Plan of Care Certification: 1/6/2025 to 3/6/2025.      Outpatient Occupational Therapy 1 - 2 times weekly for 6 weeks to include the following interventions: Paraffin, Fluidotherapy, Manual therapy/joint mobilizations, Modalities for pain management, US 3 mhz, Therapeutic exercises/activities., Strengthening, Orthotic Fabrication/Fit/Training, Edema Control, and Joint Protection.    Updates/Grading for next session: N/A      Santiago Harrison OT

## 2025-02-12 ENCOUNTER — TELEPHONE (OUTPATIENT)
Dept: ORTHOPEDICS | Facility: CLINIC | Age: 55
End: 2025-02-12
Payer: MEDICAID

## 2025-02-12 NOTE — TELEPHONE ENCOUNTER
Contacted [facility] d/t not receiving EMG results.  Per [facility] they attempted to contact pt several times to schedule with no succuss.  I contacted pt this morning.  She states that it slipped her mind.  I gave pt the contact number for [facility] for her to call and schedule EMG before her appt with Dr Mason on 04/23/2025.  Pt states she will contact them to schedule.

## 2025-02-19 ENCOUNTER — DOCUMENTATION ONLY (OUTPATIENT)
Dept: REHABILITATION | Facility: HOSPITAL | Age: 55
End: 2025-02-19
Payer: MEDICAID

## 2025-02-19 NOTE — PROGRESS NOTES
Occupational Therapy Daily Treatment Note     Date: 2/19/2025  Name: Ami Kamara  MRN: [id number]    Diagnosis:   No diagnosis found.    Referring Physician: No ref. provider found    Physician Orders: OT Evaluate and treat  Medical Diagnosis: De Quervain's Tenosynovitis   Surgical Procedure and Date: NA  Evaluation Date: 1/6/2025  Insurance Authorization Period Expiration: 12/13/2024 - 12/13./2025  Plan of Care Certification Period: Discharge.  Progress Note Due: 2/6/2025   Date of Return to MD: 4/23/25    Visit # / Visits authorized: 1 / 20  FAJARDO visit number: 1  Time In:   Time Out:   Total Billable Time:    Precautions:  Standard      Subjective     Pt reports:   she was compliant with home exercise program given last session.     Pain: 6/10  Location: right wrists      Objective     Home Exercises and Education Provided     Education provided:   - Roles and goals of Occupational therapy  - Occupational therapy plan of care  - Progress towards goals     Written Home Exercises Provided: Patient instructed to cont prior HEP.  Exercises were reviewed and Ami was able to demonstrate them prior to the end of the session.  Ami demonstrated good  understanding of the HEP provided.   .   See EMR under Patient Instructions for exercises provided prior visit.        Assessment     Pt with >3 no show cancellations to OT appointments at this time. Pt will be discharged from OT due to poor attendance per attendance policy.     Goals:  The following goals were discussed with the patient and patient is in agreement with them as to be addressed in the treatment plan.      Short Term Goals (STGs); to be met within 4 weeks.  STG #1: Pt will report 7 out of 10 pain level at worst in right upper extremity.  STG #2: Pt will demonstrate increased ROM of 20 degrees flexion and extension of wrist in order increase functional use of UE  STG #3: Pt will demonstrate increased  strength of 10#s in order to increase fine  motor skills and dexterity.  STG #4: Pt will complete the 9 peg strength test in order to improve fine motor dexterity.  STG #5: Pt will demonstrate independence with issued HEP.   STG #6:  Pt will demonstrate increased ROM of 10 ALMEIDA in hand in order increase functional use of UE     Long Term Goals (LTGs); to be met by discharge.  LTG #1: Pt will report a pain level of 4 out of 10 at worst in left upper extremity.       LTG #2: Pt will demo improved FOTO score to predicted level.   LTG #3: Pt will return to prior level of function for ADL.   LTG #4:  Pt will demonstrate increased ROM of 15 ALMEIDA in hand in order increase functional use of upper extremity  LTG #5: Pt will demonstrate increased ROM of 20 degrees flexion and extension of wrist in order increase functional use of UE  LTG #6: Pt will demonstrate increased  strength of 10#s in order to increase fine motor skills and dexterity.    Plan   Pt to be discharged from OT at this time due to attendance.      Santiago Harrison OT

## 2025-03-17 ENCOUNTER — HOSPITAL ENCOUNTER (EMERGENCY)
Facility: HOSPITAL | Age: 55
Discharge: HOME | End: 2025-03-17
Attending: SURGERY
Payer: MEDICAID

## 2025-03-17 VITALS
RESPIRATION RATE: 16 BRPM | DIASTOLIC BLOOD PRESSURE: 68 MMHG | HEART RATE: 89 BPM | OXYGEN SATURATION: 99 % | WEIGHT: 173.06 LBS | HEIGHT: 65 IN | TEMPERATURE: 98 F | SYSTOLIC BLOOD PRESSURE: 117 MMHG | BODY MASS INDEX: 28.83 KG/M2

## 2025-03-17 DIAGNOSIS — G89.29 CHRONIC DENTAL PAIN: Primary | ICD-10-CM

## 2025-03-17 DIAGNOSIS — K02.9 DENTAL CARIES: ICD-10-CM

## 2025-03-17 DIAGNOSIS — J01.90 ACUTE SINUSITIS, RECURRENCE NOT SPECIFIED, UNSPECIFIED LOCATION: ICD-10-CM

## 2025-03-17 DIAGNOSIS — K08.9 CHRONIC DENTAL PAIN: Primary | ICD-10-CM

## 2025-03-17 LAB
GROUP A STREP, MOLECULAR: NEGATIVE
INFLUENZA A, MOLECULAR: NEGATIVE
INFLUENZA B, MOLECULAR: NEGATIVE
SARS-COV-2 RDRP RESP QL NAA+PROBE: NEGATIVE
SPECIMEN SOURCE: NORMAL

## 2025-03-17 PROCEDURE — 87502 INFLUENZA DNA AMP PROBE: CPT | Performed by: SURGERY

## 2025-03-17 PROCEDURE — 87635 SARS-COV-2 COVID-19 AMP PRB: CPT | Performed by: SURGERY

## 2025-03-17 PROCEDURE — 87651 STREP A DNA AMP PROBE: CPT | Performed by: SURGERY

## 2025-03-17 PROCEDURE — 99284 EMERGENCY DEPT VISIT MOD MDM: CPT

## 2025-03-17 RX ORDER — BENZONATATE 100 MG/1
100 CAPSULE ORAL 3 TIMES DAILY PRN
Qty: 20 CAPSULE | Refills: 0 | Status: SHIPPED | OUTPATIENT
Start: 2025-03-17 | End: 2025-03-27

## 2025-03-17 RX ORDER — IBUPROFEN 800 MG/1
800 TABLET ORAL EVERY 6 HOURS PRN
Qty: 20 TABLET | Refills: 0 | Status: SHIPPED | OUTPATIENT
Start: 2025-03-17

## 2025-03-17 RX ORDER — AMOXICILLIN 875 MG/1
875 TABLET, FILM COATED ORAL 2 TIMES DAILY
Qty: 14 TABLET | Refills: 0 | Status: SHIPPED | OUTPATIENT
Start: 2025-03-17 | End: 2025-03-24

## 2025-03-17 RX ORDER — FLUTICASONE PROPIONATE 50 MCG
1 SPRAY, SUSPENSION (ML) NASAL DAILY PRN
Qty: 15 G | Refills: 0 | Status: SHIPPED | OUTPATIENT
Start: 2025-03-17 | End: 2025-03-24

## 2025-03-17 NOTE — Clinical Note
"Ami"Saadia Kamara was seen and treated in our emergency department on 3/17/2025.  She may return to work on 03/21/2025.       If you have any questions or concerns, please don't hesitate to call.      Sahara Thomas, NP"

## 2025-03-17 NOTE — ED TRIAGE NOTES
C/o nasal congestion, cough, headache, runny nose since yesterday. Also c/o toothache x a few days.

## 2025-03-17 NOTE — ED PROVIDER NOTES
Encounter Date: 3/17/2025       History     Chief Complaint   Patient presents with    General Illness    Dental Pain     Ami Kamara is a 55 y.o. female with PMH of anxiety, GERD, seizure disorder presenting to the ED for evaluation of upper respiratory symptoms and dental pain.  Presents with a two-day history of nasal congestion and cough.  Cough is loose and nonproductive.  Denies chest pain or shortness of breath.  Denies fever.  She also presents with left upper molar dental pain that she describes as throbbing, exacerbated by chewing, currently rated 8/10 in severity.  Denies associated facial swelling, difficulty swallowing, or breathing.     The history is provided by the patient.     Review of patient's allergies indicates:  No Known Allergies  Past Medical History:   Diagnosis Date    Anxiety     GERD (gastroesophageal reflux disease)     Seizures      Past Surgical History:   Procedure Laterality Date     SECTION      x2    COLONOSCOPY N/A 2019    Procedure: COLONOSCOPY;  Surgeon: Geovani Willard MD;  Location: [facility];  Service: Endoscopy;  Laterality: N/A;    ESOPHAGOGASTRODUODENOSCOPY N/A 2019    Procedure: EGD (ESOPHAGOGASTRODUODENOSCOPY);  Surgeon: Geovani Wilalrd MD;  Location: [facility];  Service: Endoscopy;  Laterality: N/A;    TUBAL LIGATION       Family History   Problem Relation Name Age of Onset    Hypertension Mother      Seizures Mother      Hypertension Father       Social History[1]  Review of Systems   Constitutional:  Negative for activity change, chills and fever.   HENT:  Positive for congestion, dental problem (Left upper molar), postnasal drip and sinus pain. Negative for ear discharge, ear pain, sinus pressure and sore throat.    Respiratory:  Positive for cough. Negative for chest tightness and shortness of breath.    Cardiovascular:  Negative for chest pain.   Gastrointestinal:  Negative for abdominal distention, abdominal pain and  nausea.   Genitourinary:  Negative for dysuria, frequency and urgency.   Musculoskeletal:  Negative for back pain.   Skin: Negative.  Negative for rash.   Neurological:  Negative for dizziness, weakness, light-headedness and numbness.   Hematological:  Does not bruise/bleed easily.       Physical Exam     Initial Vitals [03/17/25 1550]   BP Pulse Resp Temp SpO2   120/73 91 20 98.4 °F (36.9 °C) 99 %      MAP       --         Physical Exam    Nursing note and vitals reviewed.  Constitutional: She appears well-developed and well-nourished.   HENT:   Head: Normocephalic and atraumatic.   Nose: Mucosal edema and rhinorrhea present. Right sinus exhibits frontal sinus tenderness. Left sinus exhibits frontal sinus tenderness. Mouth/Throat: Uvula is midline, oropharynx is clear and moist and mucous membranes are normal. Dental caries (Multiple dental caries throughout) present.   Eyes: Conjunctivae and EOM are normal. Pupils are equal, round, and reactive to light.   Neck: Neck supple.   Normal range of motion.  Cardiovascular:  Normal rate, regular rhythm, normal heart sounds and intact distal pulses.           Pulmonary/Chest: Breath sounds normal.   Abdominal: Abdomen is soft. Bowel sounds are normal.   Musculoskeletal:         General: Normal range of motion.      Cervical back: Normal range of motion and neck supple.     Neurological: She is alert and oriented to person, place, and time. She has normal strength.   Skin: Skin is warm and dry.   Psychiatric: She has a normal mood and affect. Her behavior is normal. Judgment and thought content normal.         ED Course   Procedures  Labs Reviewed   INFLUENZA A & B BY MOLECULAR       Result Value    Influenza A, Molecular Negative      Influenza B, Molecular Negative      Flu A & B Source Nasal swab     GROUP A STREP, MOLECULAR    Group A Strep, Molecular Negative     SARS-COV-2 RNA AMPLIFICATION, QUAL    SARS-CoV-2 RNA, Amplification, Qual Negative            Imaging  Results    None          Medications - No data to display  Medical Decision Making  Evaluation of a 55-year-old female presenting with upper respiratory symptoms and dental pain.  Presents nontoxic appearing with stable vital signs  Physical exam with mucosal edema, clear nasal discharge and frontal sinus tenderness  Left upper molar dental caries; no facial swelling, trismus, or evidence of David's.  Breath sounds are clear on exam    Differential diagnosis includes flu, strep, COVID, gingivitis, dental caries, abscess     Amount and/or Complexity of Data Reviewed  Labs: ordered. Decision-making details documented in ED Course.    Risk  Prescription drug management.  Risk Details: Stable for discharge home.  Patient tested negative for influenza and COVID.  Will discharge with amoxicillin for dental infection. Flonase and tessalon perles for URI symptoms. Patient/caregiver voices understanding and feels comfortable with discharge plan.      The patient acknowledges that close follow up with medical provider is required. Instructed to follow up with PCP within 2 days. Patient was given specific return precautions. The patient agrees to comply with all instruction and directions given in the ER.                                        Clinical Impression:  Final diagnoses:  [K08.9, G89.29] Chronic dental pain (Primary)  [K02.9] Dental caries  [J01.90] Acute sinusitis, recurrence not specified, unspecified location          ED Disposition Condition    Discharge Stable          ED Prescriptions       Medication Sig Dispense Start Date End Date Auth. Provider    amoxicillin (AMOXIL) 875 MG tablet Take 1 tablet (875 mg total) by mouth 2 (two) times daily. for 7 days 14 tablet 3/17/2025 3/24/2025 Florin Choudhary MD    ibuprofen (ADVIL,MOTRIN) 800 MG tablet Take 1 tablet (800 mg total) by mouth every 6 (six) hours as needed for Pain. 20 tablet 3/17/2025 -- Florin Choudhary MD    benzonatate (TESSALON) 100 MG capsule  Take 1 capsule (100 mg total) by mouth 3 (three) times daily as needed for Cough. 20 capsule 3/17/2025 3/27/2025 Sahara Thomas NP    fluticasone propionate (FLONASE) 50 mcg/actuation nasal spray 1 spray (50 mcg total) by Each Nostril route daily as needed for Rhinitis or Allergies. 15 g 3/17/2025 3/24/2025 Sahara Thomas NP          Follow-up Information       Follow up With Specialties Details Why Contact Info    Dental, [facility] Schedule an appointment as soon as possible for a visit in 2 days  [address] [place] [id number]  [phone]                   [1]   Social History  Tobacco Use    Smoking status: Every Day     Current packs/day: 0.25     Average packs/day: 1 pack/day for 34.2 years (33.2 ttl pk-yrs)     Types: Cigarettes     Start date: 1991    Smokeless tobacco: Never    Tobacco comments:     Enroll  in  [employer]   Substance Use Topics    Alcohol use: Yes     Comment: occ    Drug use: Yes     Types: Marijuana     Comment: occ        Sahara Thomas NP  03/17/25 4391

## 2025-03-20 ENCOUNTER — PATIENT OUTREACH (OUTPATIENT)
Facility: OTHER | Age: 55
End: 2025-03-20
Payer: MEDICAID

## 2025-03-21 NOTE — PROGRESS NOTES
ED Navigator attempted to contact patient on 2 separate occasions; patient is unable to reach. ED Navigator to close encounter at this time.    Marlee Cortes  ED Navigator  [phone]

## 2025-04-23 ENCOUNTER — OFFICE VISIT (OUTPATIENT)
Dept: ORTHOPEDICS | Facility: CLINIC | Age: 55
End: 2025-04-23
Payer: MEDICAID

## 2025-04-23 DIAGNOSIS — M25.531 RIGHT WRIST PAIN: ICD-10-CM

## 2025-04-23 DIAGNOSIS — M65.4 DE QUERVAIN'S TENOSYNOVITIS, RIGHT: Primary | ICD-10-CM

## 2025-04-23 PROCEDURE — 20550 NJX 1 TENDON SHEATH/LIGAMENT: CPT | Mod: S$PBB,RT,, | Performed by: ORTHOPAEDIC SURGERY

## 2025-04-23 PROCEDURE — 1160F RVW MEDS BY RX/DR IN RCRD: CPT | Mod: CPTII,,, | Performed by: ORTHOPAEDIC SURGERY

## 2025-04-23 PROCEDURE — 20550 NJX 1 TENDON SHEATH/LIGAMENT: CPT | Mod: PBBFAC | Performed by: ORTHOPAEDIC SURGERY

## 2025-04-23 PROCEDURE — 99203 OFFICE O/P NEW LOW 30 MIN: CPT | Mod: S$PBB,25,, | Performed by: ORTHOPAEDIC SURGERY

## 2025-04-23 PROCEDURE — 1159F MED LIST DOCD IN RCRD: CPT | Mod: CPTII,,, | Performed by: ORTHOPAEDIC SURGERY

## 2025-04-23 PROCEDURE — 99999PBSHW PR PBB SHADOW TECHNICAL ONLY FILED TO HB: Mod: PBBFAC,,,

## 2025-04-23 RX ORDER — TRIAMCINOLONE ACETONIDE 40 MG/ML
20 INJECTION, SUSPENSION INTRA-ARTICULAR; INTRAMUSCULAR
Status: COMPLETED | OUTPATIENT
Start: 2025-04-23 | End: 2025-04-23

## 2025-04-23 RX ORDER — SULINDAC 150 MG/1
150 TABLET ORAL 2 TIMES DAILY WITH MEALS
Qty: 60 TABLET | Refills: 1 | Status: SHIPPED | OUTPATIENT
Start: 2025-04-23

## 2025-04-23 RX ADMIN — TRIAMCINOLONE ACETONIDE 20 MG: 40 INJECTION, SUSPENSION INTRA-ARTICULAR; INTRAMUSCULAR at 04:04

## 2025-04-23 NOTE — PROGRESS NOTES
Subjective:      Patient ID: Ami Kamara is a 55 y.o. female.  Chief Complaint: Pain of the Right Wrist      HPI  Ami Kamara is a  55 y.o. female presenting today for follow up of right hand symptoms.  She reports that she is having right wrist pain at the base of the right thumb previously she had some numbness but now she is not reporting any numbness or tingling in the hands   A recent nerve conduction study showed evidence of mild bilateral carpal tunnel syndrome.    Review of patient's allergies indicates:  No Known Allergies      Current Medications[1]    Past Medical History:   Diagnosis Date    Anxiety     GERD (gastroesophageal reflux disease)     Seizures        Past Surgical History:   Procedure Laterality Date     SECTION      x2    COLONOSCOPY N/A 2019    Procedure: COLONOSCOPY;  Surgeon: Geovani Willard MD;  Location: [facility];  Service: Endoscopy;  Laterality: N/A;    ESOPHAGOGASTRODUODENOSCOPY N/A 2019    Procedure: EGD (ESOPHAGOGASTRODUODENOSCOPY);  Surgeon: Geovani Willard MD;  Location: [facility];  Service: Endoscopy;  Laterality: N/A;    TUBAL LIGATION         OBJECTIVE:   PHYSICAL EXAM:       There were no vitals filed for this visit.  Ortho/SPM Exam  Examination of the hands show some tenderness at the right wrist radial styloid area   Mildly positive Finkelstein test   Tinel sign negative   Range of motion fingers full   Both thumbs had a slight deformity at the MP joint which is nontender    RADIOGRAPHS:  None  Comments: I have personally reviewed the imaging and I agree with the above radiologist's report.    ASSESSMENT/PLAN:     IMPRESSION:  1. Right wrist de Quervain tendinitis.      2. Possible asymptomatic carpal tunnel syndrome bilaterally    PLAN:  I explained the nature of the problem to the patient   Recommended injection for the de Quervain symptoms   After pause for time-out identified the right wrist injected 1st dorsal  compartment with combination Kenalog 20 mg 0.5 cc xylocaine sterile technique   Tolerated the procedure well without complication   Continue wrist brace as needed anti-inflammatory medication by mouth   If the numbness worsens or recurs we may consider surgical treatment    FOLLOW UP:  1-2 months    Disclaimer: This note has been generated using voice-recognition software. There may be typographical errors that have been missed during proof-reading.          [1]   Current Outpatient Medications   Medication Sig Dispense Refill    diclofenac (VOLTAREN) 75 MG EC tablet TAKE 1 TABLET BY MOUTH 2 TIMES DAILY AS NEEDED (PAIN). 60 tablet 0    FLUoxetine 20 MG capsule Take 1 capsule (20 mg total) by mouth once daily. (Patient not taking: Reported on 12/13/2024) 90 capsule 1    hydroCHLOROthiazide (HYDRODIURIL) 25 MG tablet Take 1 tablet (25 mg total) by mouth once daily. 90 tablet 1    ibuprofen (ADVIL,MOTRIN) 800 MG tablet Take 1 tablet (800 mg total) by mouth every 6 (six) hours as needed for Pain. 20 tablet 0    mupirocin (BACTROBAN) 2 % ointment Apply topically 3 (three) times daily. (Patient not taking: Reported on 12/13/2024) 15 g 0     No current facility-administered medications for this visit.

## 2025-05-01 ENCOUNTER — TELEPHONE (OUTPATIENT)
Dept: FAMILY MEDICINE | Facility: CLINIC | Age: 55
End: 2025-05-01
Payer: MEDICAID

## 2025-05-01 DIAGNOSIS — I10 ESSENTIAL HYPERTENSION: ICD-10-CM

## 2025-05-01 RX ORDER — HYDROCHLOROTHIAZIDE 25 MG/1
25 TABLET ORAL
Qty: 90 TABLET | Refills: 0 | Status: SHIPPED | OUTPATIENT
Start: 2025-05-01

## 2025-05-01 NOTE — TELEPHONE ENCOUNTER
----- Message from [facility] sent at 5/1/2025  8:18 AM CDT -----  Contact: patient  Ami KamaraMRN: [id number]: 1970PCP: Sam HamiltonHome Phone      [phone] Phone      Not on file.Mobile          [phone]: Pt requesting refill or new Rx. Is this a refill or new RX:  refill RX name and strength: hydroCHLOROthiazide (HYDRODIURIL) 25 MG tablet Last office visit: 04/02/2024 Is this a 30-day or 90-day RX:  90 day rx Pharmacy name and location:  [facility]/pharmacy #[id number]  [address] WOODS [address] 1 Comments:  Phone:  [phone]

## 2025-05-01 NOTE — TELEPHONE ENCOUNTER
No care due was identified.  Health [facility] Embedded Care Due Messages. Reference number: [id number].   5/01/2025 12:16:49 AM CDT

## 2025-05-27 ENCOUNTER — HOSPITAL ENCOUNTER (EMERGENCY)
Facility: HOSPITAL | Age: 55
Discharge: HOME | End: 2025-05-27
Payer: MEDICAID

## 2025-05-27 VITALS
OXYGEN SATURATION: 98 % | WEIGHT: 180.25 LBS | SYSTOLIC BLOOD PRESSURE: 132 MMHG | HEART RATE: 64 BPM | DIASTOLIC BLOOD PRESSURE: 72 MMHG | RESPIRATION RATE: 18 BRPM | BODY MASS INDEX: 30.03 KG/M2 | TEMPERATURE: 98 F | HEIGHT: 65 IN

## 2025-05-27 DIAGNOSIS — R10.9 ABDOMINAL PAIN: ICD-10-CM

## 2025-05-27 DIAGNOSIS — R10.13 EPIGASTRIC ABDOMINAL PAIN: Primary | ICD-10-CM

## 2025-05-27 LAB
ABSOLUTE EOSINOPHIL (OHS): 0.2 K/UL
ABSOLUTE MONOCYTE (OHS): 0.51 K/UL (ref 0.3–1)
ABSOLUTE NEUTROPHIL COUNT (OHS): 2.77 K/UL (ref 1.8–7.7)
ALBUMIN SERPL BCP-MCNC: 4.1 G/DL (ref 3.5–5.2)
ALP SERPL-CCNC: 100 UNIT/L (ref 40–150)
ALT SERPL W/O P-5'-P-CCNC: 15 UNIT/L (ref 10–44)
ANION GAP (OHS): 8 MMOL/L (ref 8–16)
AST SERPL-CCNC: 14 UNIT/L (ref 11–45)
BASOPHILS # BLD AUTO: 0.03 K/UL
BASOPHILS NFR BLD AUTO: 0.5 %
BILIRUB SERPL-MCNC: 0.3 MG/DL (ref 0.1–1)
BILIRUB UR QL STRIP.AUTO: NEGATIVE
BUN SERPL-MCNC: 18 MG/DL (ref 6–20)
CALCIUM SERPL-MCNC: 9.6 MG/DL (ref 8.7–10.5)
CHLORIDE SERPL-SCNC: 105 MMOL/L (ref 95–110)
CLARITY UR: CLEAR
CO2 SERPL-SCNC: 27 MMOL/L (ref 23–29)
COLOR UR AUTO: YELLOW
CREAT SERPL-MCNC: 0.9 MG/DL (ref 0.5–1.4)
ERYTHROCYTE [DISTWIDTH] IN BLOOD BY AUTOMATED COUNT: 13.2 % (ref 11.5–14.5)
GFR SERPLBLD CREATININE-BSD FMLA CKD-EPI: >60 ML/MIN/1.73/M2
GLUCOSE SERPL-MCNC: 93 MG/DL (ref 70–110)
GLUCOSE UR QL STRIP: NEGATIVE
HCT VFR BLD AUTO: 35.2 % (ref 37–48.5)
HGB BLD-MCNC: 11.7 GM/DL (ref 12–16)
HGB UR QL STRIP: ABNORMAL
HOLD SPECIMEN: NORMAL
IMM GRANULOCYTES # BLD AUTO: 0.01 K/UL (ref 0–0.04)
IMM GRANULOCYTES NFR BLD AUTO: 0.2 % (ref 0–0.5)
KETONES UR QL STRIP: NEGATIVE
LEUKOCYTE ESTERASE UR QL STRIP: NEGATIVE
LIPASE SERPL-CCNC: 48 U/L (ref 4–60)
LYMPHOCYTES # BLD AUTO: 2.63 K/UL (ref 1–4.8)
MCH RBC QN AUTO: 26.5 PG (ref 27–31)
MCHC RBC AUTO-ENTMCNC: 33.2 G/DL (ref 32–36)
MCV RBC AUTO: 80 FL (ref 82–98)
NITRITE UR QL STRIP: NEGATIVE
NUCLEATED RBC (/100WBC) (OHS): 0 /100 WBC
PH UR STRIP: 6 [PH]
PLATELET # BLD AUTO: 222 K/UL (ref 150–450)
PMV BLD AUTO: 10.2 FL (ref 9.2–12.9)
POTASSIUM SERPL-SCNC: 4.5 MMOL/L (ref 3.5–5.1)
PROT SERPL-MCNC: 8.1 GM/DL (ref 6–8.4)
PROT UR QL STRIP: NEGATIVE
RBC # BLD AUTO: 4.42 M/UL (ref 4–5.4)
RELATIVE EOSINOPHIL (OHS): 3.3 %
RELATIVE LYMPHOCYTE (OHS): 42.8 % (ref 18–48)
RELATIVE MONOCYTE (OHS): 8.3 % (ref 4–15)
RELATIVE NEUTROPHIL (OHS): 44.9 % (ref 38–73)
SODIUM SERPL-SCNC: 140 MMOL/L (ref 136–145)
SP GR UR STRIP: 1.01
TROPONIN I SERPL DL<=0.01 NG/ML-MCNC: <0.006 NG/ML
UROBILINOGEN UR STRIP-ACNC: NEGATIVE EU/DL
WBC # BLD AUTO: 6.15 K/UL (ref 3.9–12.7)

## 2025-05-27 PROCEDURE — 83690 ASSAY OF LIPASE: CPT | Performed by: NURSE PRACTITIONER

## 2025-05-27 PROCEDURE — 81003 URINALYSIS AUTO W/O SCOPE: CPT

## 2025-05-27 PROCEDURE — 25000003 PHARM REV CODE 250: Performed by: NURSE PRACTITIONER

## 2025-05-27 PROCEDURE — 82040 ASSAY OF SERUM ALBUMIN: CPT | Performed by: NURSE PRACTITIONER

## 2025-05-27 PROCEDURE — 99284 EMERGENCY DEPT VISIT MOD MDM: CPT | Mod: 25

## 2025-05-27 PROCEDURE — 84484 ASSAY OF TROPONIN QUANT: CPT | Performed by: NURSE PRACTITIONER

## 2025-05-27 PROCEDURE — 85025 COMPLETE CBC W/AUTO DIFF WBC: CPT | Performed by: NURSE PRACTITIONER

## 2025-05-27 RX ORDER — LIDOCAINE HYDROCHLORIDE 20 MG/ML
15 SOLUTION OROPHARYNGEAL ONCE
Status: COMPLETED | OUTPATIENT
Start: 2025-05-27 | End: 2025-05-27

## 2025-05-27 RX ORDER — OMEPRAZOLE 40 MG/1
40 CAPSULE, DELAYED RELEASE ORAL DAILY
Qty: 90 CAPSULE | Refills: 3 | Status: SHIPPED | OUTPATIENT
Start: 2025-05-27 | End: 2026-05-27

## 2025-05-27 RX ORDER — ALUMINUM HYDROXIDE, MAGNESIUM HYDROXIDE, AND SIMETHICONE 1200; 120; 1200 MG/30ML; MG/30ML; MG/30ML
30 SUSPENSION ORAL ONCE
Status: COMPLETED | OUTPATIENT
Start: 2025-05-27 | End: 2025-05-27

## 2025-05-27 RX ADMIN — LIDOCAINE HYDROCHLORIDE 15 ML: 20 SOLUTION ORAL at 02:05

## 2025-05-27 RX ADMIN — ALUMINUM HYDROXIDE, MAGNESIUM HYDROXIDE, AND DIMETHICONE 30 ML: 200; 20; 200 SUSPENSION ORAL at 02:05

## 2025-05-27 NOTE — Clinical Note
"Ami Jaureguiabram Kamara was seen and treated in our emergency department on 5/27/2025.  She may return to work on 05/28/2025.       If you have any questions or concerns, please don't hesitate to call.      Chet PHILIP    "

## 2025-05-27 NOTE — ED PROVIDER NOTES
Encounter Date: 2025       History     Chief Complaint   Patient presents with    Abdominal Pain     Pt arrived to ED c/o upper abdominal pain that radiates to her back that started this morning. Pt reports history of reflux. Pt rates pain 8/10       Chief complaint: Abdominal pain   55-year-old female with a history of anxiety GERD seizures presents to be evaluated for abdominal pain that she had after waking up this morning.  Reports that she has a burning sensation that does radiate somewhat to her back.  She reports history flexes been having a poor diet.  Denies chest pain denies palpitations denies nausea or vomiting states she has had intermittent diarrhea frequently.  Currently states an 8/10 pain.      Review of patient's allergies indicates:  No Known Allergies  Past Medical History:   Diagnosis Date    Anxiety     GERD (gastroesophageal reflux disease)     Seizures      Past Surgical History:   Procedure Laterality Date     SECTION      x2    COLONOSCOPY N/A 2019    Procedure: COLONOSCOPY;  Surgeon: Geovani Willard MD;  Location: [facility];  Service: Endoscopy;  Laterality: N/A;    ESOPHAGOGASTRODUODENOSCOPY N/A 2019    Procedure: EGD (ESOPHAGOGASTRODUODENOSCOPY);  Surgeon: Geovani Willard MD;  Location: [facility];  Service: Endoscopy;  Laterality: N/A;    TUBAL LIGATION       Family History   Problem Relation Name Age of Onset    Hypertension Mother      Seizures Mother      Hypertension Father       Social History[1]  Review of Systems   Constitutional:  Negative for fatigue and fever.   Respiratory:  Negative for chest tightness and shortness of breath.    Cardiovascular:  Negative for chest pain and palpitations.   Gastrointestinal:  Positive for abdominal pain and diarrhea. Negative for constipation, nausea and vomiting.   Musculoskeletal:  Positive for back pain.       Physical Exam     Initial Vitals [25 1231]   BP Pulse Resp Temp SpO2   135/76 76 17 98.3 °F  (36.8 °C) 98 %      MAP       --         Physical Exam    Nursing note and vitals reviewed.  Constitutional: She appears well-developed and well-nourished.   HENT:   Head: Normocephalic and atraumatic.   Cardiovascular:  Normal rate.     Exam reveals no gallop and no friction rub.       No murmur heard.  Pulmonary/Chest: Breath sounds normal. She has no wheezes. She has no rhonchi. She has no rales.   Abdominal: She exhibits no distension. There is no abdominal tenderness. There is no rebound and no guarding.     Neurological: She is alert and oriented to person, place, and time.   Skin: Skin is warm and dry.   Psychiatric: She has a normal mood and affect. Thought content normal.         ED Course   Procedures  Labs Reviewed   URINALYSIS, REFLEX TO URINE CULTURE - Abnormal       Result Value    Color, UA Yellow      Appearance, UA Clear      pH, UA 6.0      Spec Grav UA 1.010      Protein, UA Negative      Glucose, UA Negative      Ketones, UA Negative      Bilirubin, UA Negative      Blood, UA Trace (*)     Nitrites, UA Negative      Urobilinogen, UA Negative      Leukocyte Esterase, UA Negative     CBC WITH DIFFERENTIAL - Abnormal    WBC 6.15      RBC 4.42      HGB 11.7 (*)     HCT 35.2 (*)     MCV 80 (*)     MCH 26.5 (*)     MCHC 33.2      RDW 13.2      Platelet Count 222      MPV 10.2      Nucleated RBC 0      Neut % 44.9      Lymph % 42.8      Mono % 8.3      Eos % 3.3      Basophil % 0.5      Imm Grans % 0.2      Neut # 2.77      Lymph # 2.63      Mono # 0.51      Eos # 0.20      Baso # 0.03      Imm Grans # 0.01     COMPREHENSIVE METABOLIC PANEL - Normal    Sodium 140      Potassium 4.5      Chloride 105      CO2 27      Glucose 93      BUN 18      Creatinine 0.9      Calcium 9.6      Protein Total 8.1      Albumin 4.1      Bilirubin Total 0.3            AST 14      ALT 15      Anion Gap 8      eGFR >60     TROPONIN I - Normal    Troponin-I <0.006     LIPASE - Normal    Lipase Level 48     CBC W/ AUTO  DIFFERENTIAL    Narrative:     The following orders were created for panel order Complete Blood Count (CBC).  Procedure                               Abnormality         Status                     ---------                               -----------         ------                     CBC with Differential[[id number]]       Abnormal            Final result                 Please view results for these tests on the individual orders.   GREY TOP URINE HOLD    Extra Tube Hold for add-ons.            Imaging Results              X-Ray Abdomen Flat And Erect (Final result)  Result time 05/27/25 14:34:21      Final result by Sheron Brown MD (05/27/25 14:34:21)                   Impression:      Prominent amount of stool within colon without abnormal distention of bowel.  Faint calcific density right side of the pelvis most likely phlebolith or debris in bowel but recommend correlation clinically as if clinical consideration for urolithiasis suggest CT      Electronically signed by: Sheron Brown MD  Date:    05/27/2025  Time:    14:34               Narrative:    EXAMINATION:  XR ABDOMEN FLAT AND ERECT    CLINICAL HISTORY:  Unspecified abdominal pain    TECHNIQUE:  Flat and erect AP views of the abdomen were performed.    COMPARISON:  None    FINDINGS:  Free intraperitoneal air seen beneath the diaphragms.  Gas and stool is seen in segments of nondistended colon with mildly prominent amount of stool within colon.  No abnormal distention of bowel.  There is somewhat oval calcification right side of the pelvis in the expected region the distal right ureter.  Not densely calcified and most likely phlebolith or confluence of overlapping shadows such is debris in bowel.  If evaluation for urolithiasis is indicated clinically suggest CT.                                       Medications   aluminum-magnesium hydroxide-simethicone 200-200-20 mg/5 mL suspension 30 mL (30 mLs Oral Given 5/27/25 1427)     And   LIDOcaine  viscous HCl 2% oral solution 15 mL (15 mLs Oral Given 5/27/25 8460)     Medical Decision Making  55-year-old female presents to be evaluated for epigastric tenderness began this morning.  History reflux.  Denies chest pain denies shortness of breath   Diapers diagnoses include GERD, gastritis, abdominal pain, cholecystitis, ACS, atypical chest pain    Amount and/or Complexity of Data Reviewed  Labs: ordered.  Radiology: ordered.  Discussion of management or test interpretation with external provider(s): Patient with no abdominal tenderness to palpation   No fever   No leukocytosis or gross electrolyte derangement   Normal LFTs normal lipase   GI cocktail given in ED    Risk  OTC drugs.  Prescription drug management.                                      Clinical Impression:  Final diagnoses:  [R10.9] Abdominal pain  [R10.13] Epigastric abdominal pain (Primary)          ED Disposition Condition    Discharge Stable          ED Prescriptions       Medication Sig Dispense Start Date End Date Auth. Provider    omeprazole (PRILOSEC) 40 MG capsule Take 1 capsule (40 mg total) by mouth once daily. 90 capsule 5/27/2025 5/27/2026 Loren Valenzuela NP          Follow-up Information    None                [1]   Social History  Tobacco Use    Smoking status: Every Day     Current packs/day: 0.25     Average packs/day: 1 pack/day for 34.4 years (33.3 ttl pk-yrs)     Types: Cigarettes     Start date: 1991    Smokeless tobacco: Never    Tobacco comments:     Enroll  in  [employer]   Substance Use Topics    Alcohol use: Yes     Comment: occ    Drug use: Yes     Types: Marijuana     Comment: occ        Loren Valenzuela NP  05/27/25 3707

## 2025-06-06 ENCOUNTER — OFFICE VISIT (OUTPATIENT)
Dept: ORTHOPEDICS | Facility: CLINIC | Age: 55
End: 2025-06-06
Payer: MEDICAID

## 2025-06-06 VITALS
BODY MASS INDEX: 29.55 KG/M2 | RESPIRATION RATE: 18 BRPM | OXYGEN SATURATION: 99 % | DIASTOLIC BLOOD PRESSURE: 74 MMHG | HEIGHT: 65 IN | HEART RATE: 86 BPM | SYSTOLIC BLOOD PRESSURE: 126 MMHG | WEIGHT: 177.38 LBS

## 2025-06-06 DIAGNOSIS — G56.00 CARPAL TUNNEL SYNDROME, UNSPECIFIED LATERALITY: ICD-10-CM

## 2025-06-06 DIAGNOSIS — M25.531 RIGHT WRIST PAIN: ICD-10-CM

## 2025-06-06 DIAGNOSIS — M65.4 DE QUERVAIN'S TENOSYNOVITIS, RIGHT: Primary | ICD-10-CM

## 2025-06-06 PROCEDURE — 99999 PR PBB SHADOW E&M-EST. PATIENT-LVL III: CPT | Mod: PBBFAC,,, | Performed by: PHYSICIAN ASSISTANT

## 2025-06-06 PROCEDURE — 99213 OFFICE O/P EST LOW 20 MIN: CPT | Mod: PBBFAC | Performed by: PHYSICIAN ASSISTANT

## 2025-06-18 RX ORDER — SULINDAC 150 MG/1
150 TABLET ORAL 2 TIMES DAILY WITH MEALS
Qty: 60 TABLET | Refills: 1 | Status: SHIPPED | OUTPATIENT
Start: 2025-06-18

## 2025-07-09 ENCOUNTER — PATIENT OUTREACH (OUTPATIENT)
Dept: ADMINISTRATIVE | Facility: HOSPITAL | Age: 55
End: 2025-07-09
Payer: MEDICAID

## 2025-07-09 NOTE — PROGRESS NOTES
[facility] Q3 MCIP LDCT Gap Report   Portal active: no  Chart reviewed, immunization record updated.  No new results noted on [facility] or [facility] web site.  Care Everywhere updated.   Patient care coordination note  LOV with PCP 10/04/2024  Colonoscopy up to date, next due 5/8/2029.  Left detailed message for patient to return call to schedule MMG and LDCT Lung Screening.

## 2025-07-25 DIAGNOSIS — I10 ESSENTIAL HYPERTENSION: ICD-10-CM

## 2025-07-25 DIAGNOSIS — F41.9 ANXIETY: ICD-10-CM

## 2025-07-25 NOTE — TELEPHONE ENCOUNTER
No care due was identified.  Health [facility] Embedded Care Due Messages. Reference number: [id number].   7/25/2025 4:12:11 PM CDT

## 2025-07-28 RX ORDER — HYDROCHLOROTHIAZIDE 25 MG/1
25 TABLET ORAL
Qty: 90 TABLET | Refills: 0 | Status: SHIPPED | OUTPATIENT
Start: 2025-07-28

## 2025-07-28 RX ORDER — FLUOXETINE 20 MG/1
20 CAPSULE ORAL
Qty: 90 CAPSULE | Refills: 0 | Status: SHIPPED | OUTPATIENT
Start: 2025-07-28

## 2025-08-25 RX ORDER — SULINDAC 150 MG/1
150 TABLET ORAL 2 TIMES DAILY WITH MEALS
Qty: 60 TABLET | Refills: 1 | Status: SHIPPED | OUTPATIENT
Start: 2025-08-25